# Patient Record
Sex: FEMALE | Race: WHITE | NOT HISPANIC OR LATINO | URBAN - METROPOLITAN AREA
[De-identification: names, ages, dates, MRNs, and addresses within clinical notes are randomized per-mention and may not be internally consistent; named-entity substitution may affect disease eponyms.]

---

## 2023-06-24 ENCOUNTER — INPATIENT (INPATIENT)
Facility: HOSPITAL | Age: 88
LOS: 3 days | Discharge: NURSING, REHAB OR LONG-TERM CARE | DRG: 956 | End: 2023-06-28
Attending: GENERAL ACUTE CARE HOSPITAL | Admitting: GENERAL ACUTE CARE HOSPITAL
Payer: COMMERCIAL

## 2023-06-24 VITALS
DIASTOLIC BLOOD PRESSURE: 65 MMHG | WEIGHT: 110.23 LBS | HEART RATE: 80 BPM | SYSTOLIC BLOOD PRESSURE: 138 MMHG | OXYGEN SATURATION: 98 % | TEMPERATURE: 98 F | HEIGHT: 63 IN | RESPIRATION RATE: 18 BRPM

## 2023-06-24 DIAGNOSIS — S72.141A DISPLACED INTERTROCHANTERIC FRACTURE OF RIGHT FEMUR, INITIAL ENCOUNTER FOR CLOSED FRACTURE: ICD-10-CM

## 2023-06-24 LAB
ALBUMIN SERPL ELPH-MCNC: 4 G/DL — SIGNIFICANT CHANGE UP (ref 3.3–5)
ALP SERPL-CCNC: 68 U/L — SIGNIFICANT CHANGE UP (ref 40–120)
ALT FLD-CCNC: 34 U/L — SIGNIFICANT CHANGE UP (ref 10–45)
ANION GAP SERPL CALC-SCNC: 16 MMOL/L — SIGNIFICANT CHANGE UP (ref 5–17)
APPEARANCE UR: CLEAR — SIGNIFICANT CHANGE UP
APTT BLD: 24.4 SEC — LOW (ref 27.5–35.5)
AST SERPL-CCNC: 67 U/L — HIGH (ref 10–40)
BACTERIA # UR AUTO: NEGATIVE — SIGNIFICANT CHANGE UP
BASE EXCESS BLDV CALC-SCNC: -1.7 MMOL/L — SIGNIFICANT CHANGE UP (ref -2–3)
BASOPHILS # BLD AUTO: 0.02 K/UL — SIGNIFICANT CHANGE UP (ref 0–0.2)
BASOPHILS NFR BLD AUTO: 0.1 % — SIGNIFICANT CHANGE UP (ref 0–2)
BILIRUB SERPL-MCNC: 1.2 MG/DL — SIGNIFICANT CHANGE UP (ref 0.2–1.2)
BILIRUB UR-MCNC: NEGATIVE — SIGNIFICANT CHANGE UP
BUN SERPL-MCNC: 49 MG/DL — HIGH (ref 7–23)
CA-I SERPL-SCNC: 1.13 MMOL/L — LOW (ref 1.15–1.33)
CALCIUM SERPL-MCNC: 9.2 MG/DL — SIGNIFICANT CHANGE UP (ref 8.4–10.5)
CHLORIDE BLDV-SCNC: 103 MMOL/L — SIGNIFICANT CHANGE UP (ref 96–108)
CHLORIDE SERPL-SCNC: 102 MMOL/L — SIGNIFICANT CHANGE UP (ref 96–108)
CK MB BLD-MCNC: 1 % — SIGNIFICANT CHANGE UP (ref 0–3.5)
CK MB CFR SERPL CALC: 20 NG/ML — HIGH (ref 0–3.8)
CK SERPL-CCNC: 1977 U/L — HIGH (ref 25–170)
CO2 BLDV-SCNC: 25 MMOL/L — SIGNIFICANT CHANGE UP (ref 22–26)
CO2 SERPL-SCNC: 20 MMOL/L — LOW (ref 22–31)
COLOR SPEC: YELLOW — SIGNIFICANT CHANGE UP
CREAT SERPL-MCNC: 1.72 MG/DL — HIGH (ref 0.5–1.3)
DIFF PNL FLD: NEGATIVE — SIGNIFICANT CHANGE UP
EGFR: 29 ML/MIN/1.73M2 — LOW
EOSINOPHIL # BLD AUTO: 0 K/UL — SIGNIFICANT CHANGE UP (ref 0–0.5)
EOSINOPHIL NFR BLD AUTO: 0 % — SIGNIFICANT CHANGE UP (ref 0–6)
EPI CELLS # UR: 0 /HPF — SIGNIFICANT CHANGE UP
GAS PNL BLDV: 136 MMOL/L — SIGNIFICANT CHANGE UP (ref 136–145)
GAS PNL BLDV: SIGNIFICANT CHANGE UP
GLUCOSE BLDV-MCNC: 132 MG/DL — HIGH (ref 70–99)
GLUCOSE SERPL-MCNC: 138 MG/DL — HIGH (ref 70–99)
GLUCOSE UR QL: NEGATIVE — SIGNIFICANT CHANGE UP
HCO3 BLDV-SCNC: 24 MMOL/L — SIGNIFICANT CHANGE UP (ref 22–29)
HCT VFR BLD CALC: 30.8 % — LOW (ref 34.5–45)
HCT VFR BLDA CALC: 31 % — LOW (ref 34.5–46.5)
HGB BLD CALC-MCNC: 10.3 G/DL — LOW (ref 11.7–16.1)
HGB BLD-MCNC: 10.2 G/DL — LOW (ref 11.5–15.5)
IMM GRANULOCYTES NFR BLD AUTO: 0.6 % — SIGNIFICANT CHANGE UP (ref 0–0.9)
INR BLD: 0.97 RATIO — SIGNIFICANT CHANGE UP (ref 0.88–1.16)
KETONES UR-MCNC: SIGNIFICANT CHANGE UP
LACTATE BLDV-MCNC: 2.9 MMOL/L — HIGH (ref 0.5–2)
LEUKOCYTE ESTERASE UR-ACNC: NEGATIVE — SIGNIFICANT CHANGE UP
LYMPHOCYTES # BLD AUTO: 0.87 K/UL — LOW (ref 1–3.3)
LYMPHOCYTES # BLD AUTO: 6.2 % — LOW (ref 13–44)
MCHC RBC-ENTMCNC: 31.3 PG — SIGNIFICANT CHANGE UP (ref 27–34)
MCHC RBC-ENTMCNC: 33.1 GM/DL — SIGNIFICANT CHANGE UP (ref 32–36)
MCV RBC AUTO: 94.5 FL — SIGNIFICANT CHANGE UP (ref 80–100)
MONOCYTES # BLD AUTO: 0.88 K/UL — SIGNIFICANT CHANGE UP (ref 0–0.9)
MONOCYTES NFR BLD AUTO: 6.3 % — SIGNIFICANT CHANGE UP (ref 2–14)
NEUTROPHILS # BLD AUTO: 12.13 K/UL — HIGH (ref 1.8–7.4)
NEUTROPHILS NFR BLD AUTO: 86.8 % — HIGH (ref 43–77)
NITRITE UR-MCNC: NEGATIVE — SIGNIFICANT CHANGE UP
NRBC # BLD: 0 /100 WBCS — SIGNIFICANT CHANGE UP (ref 0–0)
PCO2 BLDV: 42 MMHG — SIGNIFICANT CHANGE UP (ref 39–42)
PH BLDV: 7.36 — SIGNIFICANT CHANGE UP (ref 7.32–7.43)
PH UR: 5.5 — SIGNIFICANT CHANGE UP (ref 5–8)
PLATELET # BLD AUTO: 216 K/UL — SIGNIFICANT CHANGE UP (ref 150–400)
PO2 BLDV: 22 MMHG — LOW (ref 25–45)
POTASSIUM BLDV-SCNC: 4.6 MMOL/L — SIGNIFICANT CHANGE UP (ref 3.5–5.1)
POTASSIUM SERPL-MCNC: 4.7 MMOL/L — SIGNIFICANT CHANGE UP (ref 3.5–5.3)
POTASSIUM SERPL-SCNC: 4.7 MMOL/L — SIGNIFICANT CHANGE UP (ref 3.5–5.3)
PROT SERPL-MCNC: 6.8 G/DL — SIGNIFICANT CHANGE UP (ref 6–8.3)
PROT UR-MCNC: ABNORMAL
PROTHROM AB SERPL-ACNC: 11.2 SEC — SIGNIFICANT CHANGE UP (ref 10.5–13.4)
RAPID RVP RESULT: SIGNIFICANT CHANGE UP
RBC # BLD: 3.26 M/UL — LOW (ref 3.8–5.2)
RBC # FLD: 14.6 % — HIGH (ref 10.3–14.5)
RBC CASTS # UR COMP ASSIST: 3 /HPF — SIGNIFICANT CHANGE UP (ref 0–4)
SAO2 % BLDV: 20.5 % — LOW (ref 67–88)
SARS-COV-2 RNA SPEC QL NAA+PROBE: SIGNIFICANT CHANGE UP
SODIUM SERPL-SCNC: 138 MMOL/L — SIGNIFICANT CHANGE UP (ref 135–145)
SP GR SPEC: 1.02 — SIGNIFICANT CHANGE UP (ref 1.01–1.02)
TROPONIN T, HIGH SENSITIVITY RESULT: 37 NG/L — SIGNIFICANT CHANGE UP (ref 0–51)
UROBILINOGEN FLD QL: NEGATIVE — SIGNIFICANT CHANGE UP
WBC # BLD: 13.98 K/UL — HIGH (ref 3.8–10.5)
WBC # FLD AUTO: 13.98 K/UL — HIGH (ref 3.8–10.5)
WBC UR QL: 0 /HPF — SIGNIFICANT CHANGE UP (ref 0–5)

## 2023-06-24 PROCEDURE — 71045 X-RAY EXAM CHEST 1 VIEW: CPT | Mod: 26

## 2023-06-24 PROCEDURE — 73552 X-RAY EXAM OF FEMUR 2/>: CPT | Mod: 26,RT

## 2023-06-24 PROCEDURE — 99285 EMERGENCY DEPT VISIT HI MDM: CPT

## 2023-06-24 PROCEDURE — 72192 CT PELVIS W/O DYE: CPT | Mod: 26

## 2023-06-24 PROCEDURE — 76376 3D RENDER W/INTRP POSTPROCES: CPT | Mod: 26

## 2023-06-24 PROCEDURE — 73560 X-RAY EXAM OF KNEE 1 OR 2: CPT | Mod: 26,RT

## 2023-06-24 PROCEDURE — 76942 ECHO GUIDE FOR BIOPSY: CPT | Mod: 26

## 2023-06-24 PROCEDURE — 73502 X-RAY EXAM HIP UNI 2-3 VIEWS: CPT | Mod: 26,RT

## 2023-06-24 PROCEDURE — 70450 CT HEAD/BRAIN W/O DYE: CPT | Mod: 26,MA

## 2023-06-24 RX ORDER — ACETAMINOPHEN 500 MG
750 TABLET ORAL ONCE
Refills: 0 | Status: COMPLETED | OUTPATIENT
Start: 2023-06-24 | End: 2023-06-24

## 2023-06-24 RX ORDER — ACETAMINOPHEN 500 MG
975 TABLET ORAL EVERY 8 HOURS
Refills: 0 | Status: DISCONTINUED | OUTPATIENT
Start: 2023-06-24 | End: 2023-06-25

## 2023-06-24 RX ORDER — BRIMONIDINE TARTRATE 2 MG/MG
1 SOLUTION/ DROPS OPHTHALMIC
Refills: 0 | Status: DISCONTINUED | OUTPATIENT
Start: 2023-06-24 | End: 2023-06-25

## 2023-06-24 RX ORDER — SENNA PLUS 8.6 MG/1
2 TABLET ORAL AT BEDTIME
Refills: 0 | Status: DISCONTINUED | OUTPATIENT
Start: 2023-06-24 | End: 2023-06-25

## 2023-06-24 RX ORDER — BUPIVACAINE HCL/PF 7.5 MG/ML
30 VIAL (ML) INJECTION ONCE
Refills: 0 | Status: COMPLETED | OUTPATIENT
Start: 2023-06-24 | End: 2023-06-24

## 2023-06-24 RX ORDER — BRIMONIDINE TARTRATE 2 MG/MG
1 SOLUTION/ DROPS OPHTHALMIC
Refills: 0 | DISCHARGE

## 2023-06-24 RX ORDER — OXYCODONE HYDROCHLORIDE 5 MG/1
2.5 TABLET ORAL EVERY 4 HOURS
Refills: 0 | Status: DISCONTINUED | OUTPATIENT
Start: 2023-06-24 | End: 2023-06-25

## 2023-06-24 RX ORDER — DORZOLAMIDE HYDROCHLORIDE TIMOLOL MALEATE 20; 5 MG/ML; MG/ML
1 SOLUTION/ DROPS OPHTHALMIC
Refills: 0 | Status: DISCONTINUED | OUTPATIENT
Start: 2023-06-24 | End: 2023-06-25

## 2023-06-24 RX ORDER — LATANOPROST 0.05 MG/ML
1 SOLUTION/ DROPS OPHTHALMIC; TOPICAL
Refills: 0 | DISCHARGE

## 2023-06-24 RX ORDER — FAMOTIDINE 10 MG/ML
20 INJECTION INTRAVENOUS ONCE
Refills: 0 | Status: DISCONTINUED | OUTPATIENT
Start: 2023-06-24 | End: 2023-06-25

## 2023-06-24 RX ORDER — DORZOLAMIDE HYDROCHLORIDE TIMOLOL MALEATE 20; 5 MG/ML; MG/ML
1 SOLUTION/ DROPS OPHTHALMIC
Refills: 0 | DISCHARGE

## 2023-06-24 RX ORDER — SODIUM CHLORIDE 9 MG/ML
1000 INJECTION, SOLUTION INTRAVENOUS
Refills: 0 | Status: DISCONTINUED | OUTPATIENT
Start: 2023-06-24 | End: 2023-06-25

## 2023-06-24 RX ORDER — OXYCODONE HYDROCHLORIDE 5 MG/1
5 TABLET ORAL EVERY 4 HOURS
Refills: 0 | Status: DISCONTINUED | OUTPATIENT
Start: 2023-06-24 | End: 2023-06-25

## 2023-06-24 RX ORDER — LATANOPROST 0.05 MG/ML
1 SOLUTION/ DROPS OPHTHALMIC; TOPICAL AT BEDTIME
Refills: 0 | Status: DISCONTINUED | OUTPATIENT
Start: 2023-06-24 | End: 2023-06-25

## 2023-06-24 RX ADMIN — Medication 300 MILLIGRAM(S): at 20:34

## 2023-06-24 NOTE — H&P ADULT - ATTENDING COMMENTS
Associated images, notes, and labs were reviewed. The patient was seen and examined. Risks and benefits of operative versus nonoperative management were discussed with the patient. The patient showed a good understanding of the injury and the treatment options. They would like to move forward with operative management of the right hip fracture.

## 2023-06-24 NOTE — ED ADULT NURSE NOTE - NSFALLHARMRISKINTERV_ED_ALL_ED
Assistance OOB with selected safe patient handling equipment if applicable/Assistance with ambulation/Communicate risk of Fall with Harm to all staff, patient, and family/Monitor gait and stability/Provide visual cue: red socks, yellow wristband, yellow gown, etc/Reinforce activity limits and safety measures with patient and family/Bed in lowest position, wheels locked, appropriate side rails in place/Call bell, personal items and telephone in reach/Instruct patient to call for assistance before getting out of bed/chair/stretcher/Non-slip footwear applied when patient is off stretcher/[place] to call system/Physically safe environment - no spills, clutter or unnecessary equipment/Purposeful Proactive Rounding/Room/bathroom lighting operational, light cord in reach

## 2023-06-24 NOTE — ED PROVIDER NOTE - PHYSICAL EXAMINATION
GENERAL: no acute distress, non-toxic appearing  HEAD: normocephalic, atraumatic  HEENT: normal conjunctiva, oral mucosa moist, neck supple  CARDIAC: regular rate and rhythm, normal S1 and S2,  no appreciable murmurs  PULM: clear to ascultation bilaterally, no crackles, rales, rhonchi, or wheezing  GI: abdomen nondistended, soft, nontender, no guarding or rebound tenderness  NEURO: alert and oriented x 3, normal speech, moving all extremities   MSK: +R hip internally rotated, shortened - difficulty ranging hip but neurovascularly intact distally, no peripheral edema, calf tenderness/redness/swelling  SKIN: no visible rashes, dry, well-perfused  PSYCH: appropriate mood and affect

## 2023-06-24 NOTE — ED PROVIDER NOTE - OBJECTIVE STATEMENT
86 yo F pmh of glaucoma presents with R hip pain after being found down on stairs at her home by tenants today. AS per patient she fell 2 weeks ago and has had R hip pain and she has been crawling around which is what she was doing today. Says she did not fall today, did not hit head. No A/C. As per EMS patient was found down on stairs and has been down for 2 days.

## 2023-06-24 NOTE — ED ADULT NURSE NOTE - OBJECTIVE STATEMENT
86 y/o F A&Ox3 (poor historian) presents to the ED complaining of right hip pain. Pt states that she fell and was "crawling around on the ground for several days." Pt cannot recall how long ago she fell. Pt was BIBEMS after tenant of pt called ambulance. Pt denies any PMH or taking blood thinners. Pt typically ambulates independently and denies LOC. On assessment, pt right leg is shortened and externally rotated. Bruising noted to the right hip. Pt is unable to move right leg. Pt denies N/V/D, fever, chills, urinary symptoms, headache, SOB, chest pain.

## 2023-06-24 NOTE — ED ADULT NURSE REASSESSMENT NOTE - NS ED NURSE REASSESS COMMENT FT1
Patient straight cathed for urine using sterile technique. Second RN present to confirm sterility. Explained procedure as it was being done - Pt tolerated procedure well. Sterile specimens collected and sent to lab as ordered. drained aprox 500ml of urine. Comfort and safety provided.

## 2023-06-24 NOTE — CONSULT NOTE ADULT - SUBJECTIVE AND OBJECTIVE BOX
JULIANO SWIFT  87y Female  MRN:[id number]    Patient is a 87y old  Female who presents with a chief complaint of right hip fx    HPI:  Patient is a 87yFemale home ambulator with assistive devices, with no known pmhx, who presents to ED w/ a c/o of right hip pain. Patient states she fell 2 weeks ago. Denies HH/LOC. States inability to walk immediately following the injury. Denies any numbness or tingling. Denies having any other pain elsewhere. Denies any previous orthopedic history. No other orthopedic concerns at this time.  denies chest pain. no sob. no n/v/d. no fevers    (24 Jun 2023 22:41)      Patient seen and evaluated at bedside in ER    PAST MEDICAL & SURGICAL HISTORY:  Glaucoma          REVIEW OF SYSTEMS:  Constitutional: No fever, chills, fatigue or weight loss.  Skin: No rash.  Eyes: No recent vision problems or eye pain.  ENT: No congestion, ear pain, or sore throat.  Endocrine: No thyroid problems.  Cardiovascular: No chest pain or palpation.  Respiratory: No cough, shortness of breath, congestion, or wheezing.  Gastrointestinal: No abdominal pain, nausea, vomiting, or diarrhea.  Genitourinary: No dysuria.  Musculoskeletal: a per hpi  Neurologic: No headache.    VITALS:  Vital Signs Last 24 Hrs  T(C): 36.9 (24 Jun 2023 17:56), Max: 36.9 (24 Jun 2023 17:56)  T(F): 98.4 (24 Jun 2023 17:56), Max: 98.4 (24 Jun 2023 17:56)  HR: 88 (24 Jun 2023 20:36) (80 - 100)  BP: 131/65 (24 Jun 2023 20:36) (120/78 - 138/65)  BP(mean): --  RR: 17 (24 Jun 2023 20:36) (17 - 18)  SpO2: 100% (24 Jun 2023 20:36) (98% - 100%)    Parameters below as of 24 Jun 2023 20:36  Patient On (Oxygen Delivery Method): room air      CAPILLARY BLOOD GLUCOSE        I&O's Summary      PHYSICAL EXAM:  GENERAL: NAD, well-developed  HEAD:  Atraumatic, Normocephalic  EYES: EOMI, PERRLA, conjunctiva and sclera clear  NECK: Supple, No JVD  CHEST/LUNG: Clear to auscultation bilaterally; No wheeze  HEART: S1, S2; No murmurs, rubs, or gallops  ABDOMEN: Soft, Nontender, Nondistended; Bowel sounds present  EXTREMITIES:  2+ Peripheral Pulses, No clubbing, cyanosis, or edema  PSYCH: Normal affect  NEUROLOGY: AAOX3; non-focal  SKIN: No rashes or lesions    Consultant(s) Notes Reviewed:  [x ] YES  [ ] NO  Care Discussed with Consultants/Other Providers [ x] YES  [ ] NO    MEDS:  MEDICATIONS  (STANDING):  brimonidine 0.2% Ophthalmic Solution 1 Drop(s) Right EYE two times a day  dorzolamide 2%/timolol 0.5% Ophthalmic Solution 1 Drop(s) Both EYES two times a day  famotidine Injectable 20 milliGRAM(s) IV Push once  lactated ringers. 1000 milliLiter(s) (125 mL/Hr) IV Continuous <Continuous>  latanoprost 0.005% Ophthalmic Solution 1 Drop(s) Both EYES at bedtime  senna 2 Tablet(s) Oral at bedtime    MEDICATIONS  (PRN):  acetaminophen     Tablet .. 975 milliGRAM(s) Oral every 8 hours PRN Mild Pain (1 - 3)  oxyCODONE    IR 5 milliGRAM(s) Oral every 4 hours PRN Severe Pain (7 - 10)  oxyCODONE    IR 2.5 milliGRAM(s) Oral every 4 hours PRN Moderate Pain (4 - 6)    ALLERGIES:  No Known Allergies      LABS:                        10.2   13.98 )-----------( 216      ( 24 Jun 2023 18:26 )             30.8     06-24    138  |  102  |  49<H>  ----------------------------<  138<H>  4.7   |  20<L>  |  1.72<H>    Ca    9.2      24 Jun 2023 18:26    TPro  6.8  /  Alb  4.0  /  TBili  1.2  /  DBili  x   /  AST  67<H>  /  ALT  34  /  AlkPhos  68  06-24    PT/INR - ( 24 Jun 2023 18:26 )   PT: 11.2 sec;   INR: 0.97 ratio         PTT - ( 24 Jun 2023 18:26 )  PTT:24.4 sec  CARDIAC MARKERS ( 24 Jun 2023 18:26 )  x     / x     / 1977 U/L / x     / 20.0 ng/mL      LIVER FUNCTIONS - ( 24 Jun 2023 18:26 )  Alb: 4.0 g/dL / Pro: 6.8 g/dL / ALK PHOS: 68 U/L / ALT: 34 U/L / AST: 67 U/L / GGT: x           Urinalysis Basic - ( 24 Jun 2023 18:26 )    Color: x / Appearance: x / SG: x / pH: x  Gluc: 138 mg/dL / Ketone: x  / Bili: x / Urobili: x   Blood: x / Protein: x / Nitrite: x   Leuk Esterase: x / RBC: x / WBC x   Sq Epi: x / Non Sq Epi: x / Bacteria: x       < from: Xray Chest 1 View- PORTABLE-Urgent (Xray Chest 1 View- PORTABLE-Urgent .) (06.24.23 @ 18:54) >    IMPRESSION:  No acute traumatic findings.        < end of copied text >  < from: Xray Femur 2 Views, Right (06.24.23 @ 18:54) >  IMPRESSION:  Acute comminuted right intertrochanteric fracture. Impaction of the   primary components with displacement of the lesser trochanteric component.  Mild bilateral hip joint space narrowing. Degenerative changes of the   lumbar spine.    < end of copied text >     EKG: NSR. inferior changes

## 2023-06-24 NOTE — ED PROVIDER NOTE - ATTENDING CONTRIBUTION TO CARE
I, Radha Tate, performed a history and physical exam of the patient and discussed their management with the resident, student, and/or fellow. I reviewed the note and agree with the documented findings and plan of care. I was present and available for all procedures.

## 2023-06-24 NOTE — H&P ADULT - HISTORY OF PRESENT ILLNESS
Patient is a 87yFemale home ambulator with assistive devices who presents to ED w/ a c/o of right hip pain. Patient states she fell last night. Denies HH/LOC. States inability to walk immediately following the injury. Denies any numbness or tingling. Denies having any other pain elsewhere. Denies any previous orthopedic history. No other orthopedic concerns at this time.

## 2023-06-24 NOTE — ED PROVIDER NOTE - CLINICAL SUMMARY MEDICAL DECISION MAKING FREE TEXT BOX
88 yo hx of glaucoma presents with fall with R hip injury - internally rotated shortened hip. Will get XR of hip, CT head, labs. Likely admit as patient cannot ambulate and lives alone. 88 yo hx of glaucoma presents with fall with R hip injury - internally rotated shortened hip. Will get XR of hip, CT head, labs. Likely admit as patient cannot ambulate and lives alone.    Radha Tate MD (Attending Physician): 87F who is hard of hearing presents for fall. Per EMS, patient fell 2 weeks ago and then fell yesterday, found by neighbors/other tenants and was on the fall. Per patient, she states she had a mechanical fall 2 wks ago but denies falling recently. Not on AC.     On exam:  Const: Cooperative, in NAD, hard of hearing   HEENT: Head is normocephalic, atraumatic. PERRLA. EOMI. Sclera and conjunctiva normal  Lungs:clear to auscultation bilaterally. No wheezes, crackles, rhonchi   Cardiovascular: RRR, no murmurs, rubs or gallops.  Abdomen: No scars. No distension. Soft and nontender. No rebound, guarding or masses noted.  Back: No midlines or paraspinous tenderness.   Skin: different stages of ecchymosis on legs   MSK: internally rotated and shortened right lower extremity. +2 dp pulses bilaterally. Tender in R greater trochanter   Neuro:Awake, AOx3, follows commands    Differentials include but are not limited to: fracture, rhabdomyolysis, infection, dehydration, dislocation, soft tissue swelling/contusion, arrhythmia. Will plan for labs, imaging.    XR concerning for fracture. Consent obtained for nerve block and will consult ortho.

## 2023-06-24 NOTE — CONSULT NOTE ADULT - ASSESSMENT
87 female no known pmhx here s/p fall with right hip fx    right hip fx  ortho following  pending OR  pain control  NWB  given elevated CK and ekg changes would check echo prior to OR    elevated CK  suspect 2/2 rhabdo  would trend  ivf    edmundo  unknown baseline  likely pre-renal  iv hydration    dvt ppx      Advanced care planning was discussed with patient and family.  Advanced care planning forms were reviewed and discussed as appropriate.  Differential diagnosis and plan of care discussed with patient after the evaluation.   Pain assessed and judicious use of narcotics when appropriate was discussed.  Importance of Fall prevention discussed.  Counseling on Smoking and Alcohol cessation was offered when appropriate.  Counseling on Diet, exercise, and medication compliance was done.       Approx 60 minutes spent.

## 2023-06-24 NOTE — H&P ADULT - NSHPPHYSICALEXAM_GEN_ALL_CORE
PHYSICAL EXAM:  T(C): 36.9 (06-24-23 @ 17:56), Max: 36.9 (06-24-23 @ 17:56)  HR: 88 (06-24-23 @ 20:36) (80 - 100)  BP: 131/65 (06-24-23 @ 20:36) (120/78 - 138/65)  RR: 17 (06-24-23 @ 20:36) (17 - 18)  SpO2: 100% (06-24-23 @ 20:36) (98% - 100%)    Gen: NAD, Resting comfortably    RLE:  Skin intact, no erythema or ecchymosis  pos bony tenderness to palpation right hip  +EHL/FHL/TA/GSC  +SILT L3-S1  + DP  Compartments soft and compressible  No calf tenderness    Secondary Exam: small superficial abrasion right elbow o/w Benign, Skin intact, NTTP along axial spine, SILT throughout, motor grossly intact throughout, no other orthopedic injuries at this time, compartments soft and compressible

## 2023-06-24 NOTE — ED PROVIDER NOTE - PROGRESS NOTE DETAILS
Radha Tate MD (Attending Physician): Fascia iliaca block done without complications. Pt tolerated procedure well.     Patient is pain free. VSS

## 2023-06-24 NOTE — H&P ADULT - ASSESSMENT
A/P: 87F w R IT fx    plan for OR tomorrow w Dr Will  npo after mn  preop labs  admit to ortho  medical optimization   Analgesia  NWB  SCD  Ice and elevate as tolerated  Discussed with attending who is in agreement with above plan  discussed with pt and family all agree

## 2023-06-25 DIAGNOSIS — S72.001A FRACTURE OF UNSPECIFIED PART OF NECK OF RIGHT FEMUR, INITIAL ENCOUNTER FOR CLOSED FRACTURE: ICD-10-CM

## 2023-06-25 LAB
ANION GAP SERPL CALC-SCNC: 10 MMOL/L — SIGNIFICANT CHANGE UP (ref 5–17)
ANION GAP SERPL CALC-SCNC: 11 MMOL/L — SIGNIFICANT CHANGE UP (ref 5–17)
APTT BLD: 23.8 SEC — LOW (ref 27.5–35.5)
BUN SERPL-MCNC: 48 MG/DL — HIGH (ref 7–23)
BUN SERPL-MCNC: 56 MG/DL — HIGH (ref 7–23)
CALCIUM SERPL-MCNC: 7.5 MG/DL — LOW (ref 8.4–10.5)
CALCIUM SERPL-MCNC: 8.2 MG/DL — LOW (ref 8.4–10.5)
CHLORIDE SERPL-SCNC: 105 MMOL/L — SIGNIFICANT CHANGE UP (ref 96–108)
CHLORIDE SERPL-SCNC: 107 MMOL/L — SIGNIFICANT CHANGE UP (ref 96–108)
CK SERPL-CCNC: 1266 U/L — HIGH (ref 25–170)
CO2 SERPL-SCNC: 22 MMOL/L — SIGNIFICANT CHANGE UP (ref 22–31)
CO2 SERPL-SCNC: 23 MMOL/L — SIGNIFICANT CHANGE UP (ref 22–31)
CREAT SERPL-MCNC: 1.1 MG/DL — SIGNIFICANT CHANGE UP (ref 0.5–1.3)
CREAT SERPL-MCNC: 1.56 MG/DL — HIGH (ref 0.5–1.3)
EGFR: 32 ML/MIN/1.73M2 — LOW
EGFR: 49 ML/MIN/1.73M2 — LOW
GLUCOSE SERPL-MCNC: 123 MG/DL — HIGH (ref 70–99)
GLUCOSE SERPL-MCNC: 125 MG/DL — HIGH (ref 70–99)
HCT VFR BLD CALC: 24.7 % — LOW (ref 34.5–45)
HCT VFR BLD CALC: 26.4 % — LOW (ref 34.5–45)
HGB BLD-MCNC: 7.8 G/DL — LOW (ref 11.5–15.5)
HGB BLD-MCNC: 8.9 G/DL — LOW (ref 11.5–15.5)
INR BLD: 0.99 RATIO — SIGNIFICANT CHANGE UP (ref 0.88–1.16)
MCHC RBC-ENTMCNC: 30.5 PG — SIGNIFICANT CHANGE UP (ref 27–34)
MCHC RBC-ENTMCNC: 31.6 GM/DL — LOW (ref 32–36)
MCHC RBC-ENTMCNC: 31.8 PG — SIGNIFICANT CHANGE UP (ref 27–34)
MCHC RBC-ENTMCNC: 33.7 GM/DL — SIGNIFICANT CHANGE UP (ref 32–36)
MCV RBC AUTO: 94.3 FL — SIGNIFICANT CHANGE UP (ref 80–100)
MCV RBC AUTO: 96.5 FL — SIGNIFICANT CHANGE UP (ref 80–100)
NRBC # BLD: 0 /100 WBCS — SIGNIFICANT CHANGE UP (ref 0–0)
NRBC # BLD: 0 /100 WBCS — SIGNIFICANT CHANGE UP (ref 0–0)
PLATELET # BLD AUTO: 140 K/UL — LOW (ref 150–400)
PLATELET # BLD AUTO: 156 K/UL — SIGNIFICANT CHANGE UP (ref 150–400)
POTASSIUM SERPL-MCNC: 3.9 MMOL/L — SIGNIFICANT CHANGE UP (ref 3.5–5.3)
POTASSIUM SERPL-MCNC: 4.3 MMOL/L — SIGNIFICANT CHANGE UP (ref 3.5–5.3)
POTASSIUM SERPL-SCNC: 3.9 MMOL/L — SIGNIFICANT CHANGE UP (ref 3.5–5.3)
POTASSIUM SERPL-SCNC: 4.3 MMOL/L — SIGNIFICANT CHANGE UP (ref 3.5–5.3)
PROTHROM AB SERPL-ACNC: 11.4 SEC — SIGNIFICANT CHANGE UP (ref 10.5–13.4)
RBC # BLD: 2.56 M/UL — LOW (ref 3.8–5.2)
RBC # BLD: 2.8 M/UL — LOW (ref 3.8–5.2)
RBC # FLD: 14.9 % — HIGH (ref 10.3–14.5)
RBC # FLD: 15 % — HIGH (ref 10.3–14.5)
SODIUM SERPL-SCNC: 139 MMOL/L — SIGNIFICANT CHANGE UP (ref 135–145)
SODIUM SERPL-SCNC: 139 MMOL/L — SIGNIFICANT CHANGE UP (ref 135–145)
WBC # BLD: 10.35 K/UL — SIGNIFICANT CHANGE UP (ref 3.8–10.5)
WBC # BLD: 9.31 K/UL — SIGNIFICANT CHANGE UP (ref 3.8–10.5)
WBC # FLD AUTO: 10.35 K/UL — SIGNIFICANT CHANGE UP (ref 3.8–10.5)
WBC # FLD AUTO: 9.31 K/UL — SIGNIFICANT CHANGE UP (ref 3.8–10.5)

## 2023-06-25 PROCEDURE — 76376 3D RENDER W/INTRP POSTPROCES: CPT | Mod: 26

## 2023-06-25 PROCEDURE — 93356 MYOCRD STRAIN IMG SPCKL TRCK: CPT

## 2023-06-25 PROCEDURE — 93306 TTE W/DOPPLER COMPLETE: CPT | Mod: 26

## 2023-06-25 PROCEDURE — 27245 TREAT THIGH FRACTURE: CPT | Mod: RT

## 2023-06-25 DEVICE — GUIDEWIRE BALL TIP 3MM X 1000MM FOR T2 R1.5 FEMORAL NAILING SYSTEM: Type: IMPLANTABLE DEVICE | Site: RIGHT | Status: FUNCTIONAL

## 2023-06-25 DEVICE — SCREW LOKG 5X37.5MM: Type: IMPLANTABLE DEVICE | Site: RIGHT | Status: FUNCTIONAL

## 2023-06-25 DEVICE — K-WIRE STRYKER 3.2M X 450MM: Type: IMPLANTABLE DEVICE | Site: RIGHT | Status: FUNCTIONAL

## 2023-06-25 DEVICE — SCREW LAG GAMMA 10.5X105MM: Type: IMPLANTABLE DEVICE | Site: RIGHT | Status: FUNCTIONAL

## 2023-06-25 DEVICE — STRYKER TROCHANTERIC NAIL 10MM X 170MM 125 DEGREE: Type: IMPLANTABLE DEVICE | Site: RIGHT | Status: FUNCTIONAL

## 2023-06-25 RX ORDER — SODIUM CHLORIDE 9 MG/ML
1000 INJECTION INTRAMUSCULAR; INTRAVENOUS; SUBCUTANEOUS
Refills: 0 | Status: DISCONTINUED | OUTPATIENT
Start: 2023-06-25 | End: 2023-06-25

## 2023-06-25 RX ORDER — SODIUM CHLORIDE 9 MG/ML
1000 INJECTION INTRAMUSCULAR; INTRAVENOUS; SUBCUTANEOUS
Refills: 0 | Status: DISCONTINUED | OUTPATIENT
Start: 2023-06-25 | End: 2023-06-28

## 2023-06-25 RX ORDER — ENOXAPARIN SODIUM 100 MG/ML
40 INJECTION SUBCUTANEOUS EVERY 24 HOURS
Refills: 0 | Status: DISCONTINUED | OUTPATIENT
Start: 2023-06-26 | End: 2023-06-28

## 2023-06-25 RX ORDER — POLYETHYLENE GLYCOL 3350 17 G/17G
17 POWDER, FOR SOLUTION ORAL DAILY
Refills: 0 | Status: DISCONTINUED | OUTPATIENT
Start: 2023-06-25 | End: 2023-06-28

## 2023-06-25 RX ORDER — ONDANSETRON 8 MG/1
4 TABLET, FILM COATED ORAL ONCE
Refills: 0 | Status: DISCONTINUED | OUTPATIENT
Start: 2023-06-25 | End: 2023-06-25

## 2023-06-25 RX ORDER — CEFAZOLIN SODIUM 1 G
2000 VIAL (EA) INJECTION EVERY 8 HOURS
Refills: 0 | Status: COMPLETED | OUTPATIENT
Start: 2023-06-25 | End: 2023-06-26

## 2023-06-25 RX ORDER — LANOLIN ALCOHOL/MO/W.PET/CERES
3 CREAM (GRAM) TOPICAL AT BEDTIME
Refills: 0 | Status: DISCONTINUED | OUTPATIENT
Start: 2023-06-25 | End: 2023-06-28

## 2023-06-25 RX ORDER — SENNA PLUS 8.6 MG/1
2 TABLET ORAL AT BEDTIME
Refills: 0 | Status: DISCONTINUED | OUTPATIENT
Start: 2023-06-25 | End: 2023-06-28

## 2023-06-25 RX ORDER — PANTOPRAZOLE SODIUM 20 MG/1
40 TABLET, DELAYED RELEASE ORAL
Refills: 0 | Status: DISCONTINUED | OUTPATIENT
Start: 2023-06-25 | End: 2023-06-28

## 2023-06-25 RX ORDER — DORZOLAMIDE HYDROCHLORIDE TIMOLOL MALEATE 20; 5 MG/ML; MG/ML
1 SOLUTION/ DROPS OPHTHALMIC
Refills: 0 | Status: DISCONTINUED | OUTPATIENT
Start: 2023-06-25 | End: 2023-06-28

## 2023-06-25 RX ORDER — ACETAMINOPHEN 500 MG
975 TABLET ORAL EVERY 8 HOURS
Refills: 0 | Status: DISCONTINUED | OUTPATIENT
Start: 2023-06-25 | End: 2023-06-28

## 2023-06-25 RX ORDER — OXYCODONE HYDROCHLORIDE 5 MG/1
5 TABLET ORAL EVERY 4 HOURS
Refills: 0 | Status: DISCONTINUED | OUTPATIENT
Start: 2023-06-25 | End: 2023-06-26

## 2023-06-25 RX ORDER — CHOLECALCIFEROL (VITAMIN D3) 125 MCG
2000 CAPSULE ORAL DAILY
Refills: 0 | Status: DISCONTINUED | OUTPATIENT
Start: 2023-06-25 | End: 2023-06-28

## 2023-06-25 RX ORDER — MAGNESIUM HYDROXIDE 400 MG/1
30 TABLET, CHEWABLE ORAL DAILY
Refills: 0 | Status: DISCONTINUED | OUTPATIENT
Start: 2023-06-25 | End: 2023-06-28

## 2023-06-25 RX ORDER — ONDANSETRON 8 MG/1
4 TABLET, FILM COATED ORAL EVERY 6 HOURS
Refills: 0 | Status: DISCONTINUED | OUTPATIENT
Start: 2023-06-25 | End: 2023-06-28

## 2023-06-25 RX ORDER — OXYCODONE HYDROCHLORIDE 5 MG/1
2.5 TABLET ORAL EVERY 4 HOURS
Refills: 0 | Status: DISCONTINUED | OUTPATIENT
Start: 2023-06-25 | End: 2023-06-26

## 2023-06-25 RX ORDER — HYDROMORPHONE HYDROCHLORIDE 2 MG/ML
0.25 INJECTION INTRAMUSCULAR; INTRAVENOUS; SUBCUTANEOUS
Refills: 0 | Status: DISCONTINUED | OUTPATIENT
Start: 2023-06-25 | End: 2023-06-25

## 2023-06-25 RX ORDER — FENTANYL CITRATE 50 UG/ML
25 INJECTION INTRAVENOUS
Refills: 0 | Status: DISCONTINUED | OUTPATIENT
Start: 2023-06-25 | End: 2023-06-25

## 2023-06-25 RX ADMIN — Medication 975 MILLIGRAM(S): at 06:11

## 2023-06-25 RX ADMIN — Medication 100 MILLIGRAM(S): at 21:38

## 2023-06-25 RX ADMIN — Medication 975 MILLIGRAM(S): at 21:38

## 2023-06-25 RX ADMIN — SENNA PLUS 2 TABLET(S): 8.6 TABLET ORAL at 21:39

## 2023-06-25 RX ADMIN — DORZOLAMIDE HYDROCHLORIDE TIMOLOL MALEATE 1 DROP(S): 20; 5 SOLUTION/ DROPS OPHTHALMIC at 21:42

## 2023-06-25 RX ADMIN — SODIUM CHLORIDE 75 MILLILITER(S): 9 INJECTION INTRAMUSCULAR; INTRAVENOUS; SUBCUTANEOUS at 14:37

## 2023-06-25 RX ADMIN — Medication 975 MILLIGRAM(S): at 22:08

## 2023-06-25 RX ADMIN — BRIMONIDINE TARTRATE 1 DROP(S): 2 SOLUTION/ DROPS OPHTHALMIC at 06:03

## 2023-06-25 RX ADMIN — Medication 975 MILLIGRAM(S): at 07:00

## 2023-06-25 RX ADMIN — DORZOLAMIDE HYDROCHLORIDE TIMOLOL MALEATE 1 DROP(S): 20; 5 SOLUTION/ DROPS OPHTHALMIC at 06:03

## 2023-06-25 RX ADMIN — SODIUM CHLORIDE 125 MILLILITER(S): 9 INJECTION INTRAMUSCULAR; INTRAVENOUS; SUBCUTANEOUS at 01:11

## 2023-06-25 RX ADMIN — Medication 3 MILLIGRAM(S): at 22:53

## 2023-06-25 NOTE — PRE-ANESTHESIA EVALUATION ADULT - NSANTHPMHFT_GEN_ALL_CORE
Patient is a 87yFemale home ambulator with assistive devices, with no known pmhx, who presents to ED w/ a c/o of right hip pain

## 2023-06-25 NOTE — PACU DISCHARGE NOTE - COMMENTS
No anesthesia complications
generalized rash (possible from bottle feeding), dx by Pediatrician as eczema and prescribed ointment (Aveeno eczema cream), not improving baby has no distress during assessment

## 2023-06-25 NOTE — BRIEF OPERATIVE NOTE - NSICDXBRIEFPROCEDURE_GEN_ALL_CORE_FT
PROCEDURES:  Open reduction and internal fixation (ORIF) of right hip with intramedullary nail 25-Jun-2023 12:49:57  Shola Fox

## 2023-06-25 NOTE — CHART NOTE - NSCHARTNOTEFT_GEN_A_CORE
Resting without complaints.  No Chest Pain, SOB, N/V.    T(C): 36.7 (06-25-23 @ 15:15), Max: 36.9 (06-24-23 @ 17:56)  HR: 65 (06-25-23 @ 15:15) (65 - 116)  BP: 114/66 (06-25-23 @ 15:15) (94/59 - 164/67)  RR: 18 (06-25-23 @ 15:15) (12 - 18)  SpO2: 96% (06-25-23 @ 15:15) (95% - 100%)  Wt(kg): --    Exam:  Alert and [place], No Acute Distress  R hip dsgs c/d/i with soft/compressible compartments  Calves soft, non-tender bilaterally  +PF/DF/EHL/FHL  SILT  + DP pulse                       7.8    9.31  )-----------( 140      ( 25 Jun 2023 13:39 )             24.7    06-25    139  |  107  |  48<H>  ----------------------------<  123<H>  3.9   |  22  |  1.10    Ca    7.5<L>      25 Jun 2023 13:39    TPro  6.8  /  Alb  4.0  /  TBili  1.2  /  DBili  x   /  AST  67<H>  /  ALT  34  /  AlkPhos  68  06-24      A/P: 87yFemale S/p R hip IM nail.  Acute blood loss anemia 2/2 fracture.  . VSS. NAD  -PT/OT-WBAT  -1U prbc  -IS  -DVT PPx  -Pain Control  -Continue Current Tx    Robinson Marmolejo PA-C  Team Pager #[phone]
POST-OPERATIVE SIGN OUT NOTE  87F s/p right hip intramedullary nail for right IT fx    -Dressing: 4x4 gauze and tegaderm  -Skin closed with monocryl, dermabond, and steri-strips  -Pain control as needed  -Complete perioperative abx  -VTE ppx: Lovenox starting POD #1  -WBAT   -OOB with assistance as needed  -PT/OT  -Ice and elevate  -Encourage incentive spirometry  -Medical management per primary team    -DC planning pending PT eval  -Discussed with attending who agrees with plan

## 2023-06-25 NOTE — PATIENT PROFILE ADULT - FALL HARM RISK - HARM RISK INTERVENTIONS
Assistance with ambulation/Assistance OOB with selected safe patient handling equipment/Communicate Risk of Fall with Harm to all staff/Discuss with provider need for PT consult/Monitor gait and stability/Reinforce activity limits and safety measures with patient and family/Tailored Fall Risk Interventions/Visual Cue: Yellow wristband and red socks/Bed in lowest position, wheels locked, appropriate side rails in place/Call bell, personal items and telephone in reach/Instruct patient to call for assistance before getting out of bed or chair/Non-slip footwear when patient is out of bed/[place] to call system/Physically safe environment - no spills, clutter or unnecessary equipment/Purposeful Proactive Rounding/Room/bathroom lighting operational, light cord in reach

## 2023-06-25 NOTE — BRIEF OPERATIVE NOTE - CONDITION POST OP
I have reviewed discharge instructions with the patient. The patient verbalized understanding. Ambulatory from ED. No s/s of reaction to medication. Stable

## 2023-06-25 NOTE — PROVIDER CONTACT NOTE (OTHER) - ACTION/TREATMENT ORDERED:
Iván Vázquez MD made aware. As per MD no interventions at this time. Encourage urination and allow additional time. Continue to monitor.

## 2023-06-25 NOTE — PROGRESS NOTE ADULT - PROBLEM SELECTOR PLAN 1
-Bedrest  -IS  -DVT PPx-hold for OR  -NPO/IVF  -Pain Control  -Continue Current Tx  -OR today    Robinson Marmolejo PA-C  Team Pager #[phone]

## 2023-06-26 LAB
ANION GAP SERPL CALC-SCNC: 10 MMOL/L — SIGNIFICANT CHANGE UP (ref 5–17)
BUN SERPL-MCNC: 28 MG/DL — HIGH (ref 7–23)
CALCIUM SERPL-MCNC: 7.9 MG/DL — LOW (ref 8.4–10.5)
CHLORIDE SERPL-SCNC: 107 MMOL/L — SIGNIFICANT CHANGE UP (ref 96–108)
CK SERPL-CCNC: 785 U/L — HIGH (ref 25–170)
CO2 SERPL-SCNC: 23 MMOL/L — SIGNIFICANT CHANGE UP (ref 22–31)
CREAT SERPL-MCNC: 0.82 MG/DL — SIGNIFICANT CHANGE UP (ref 0.5–1.3)
CULTURE RESULTS: SIGNIFICANT CHANGE UP
EGFR: 70 ML/MIN/1.73M2 — SIGNIFICANT CHANGE UP
GLUCOSE SERPL-MCNC: 101 MG/DL — HIGH (ref 70–99)
HCT VFR BLD CALC: 29.1 % — LOW (ref 34.5–45)
HGB BLD-MCNC: 9.6 G/DL — LOW (ref 11.5–15.5)
MCHC RBC-ENTMCNC: 31.3 PG — SIGNIFICANT CHANGE UP (ref 27–34)
MCHC RBC-ENTMCNC: 33 GM/DL — SIGNIFICANT CHANGE UP (ref 32–36)
MCV RBC AUTO: 94.8 FL — SIGNIFICANT CHANGE UP (ref 80–100)
NRBC # BLD: 0 /100 WBCS — SIGNIFICANT CHANGE UP (ref 0–0)
PLATELET # BLD AUTO: 107 K/UL — LOW (ref 150–400)
POTASSIUM SERPL-MCNC: 4 MMOL/L — SIGNIFICANT CHANGE UP (ref 3.5–5.3)
POTASSIUM SERPL-SCNC: 4 MMOL/L — SIGNIFICANT CHANGE UP (ref 3.5–5.3)
RBC # BLD: 3.07 M/UL — LOW (ref 3.8–5.2)
RBC # FLD: 15.2 % — HIGH (ref 10.3–14.5)
SODIUM SERPL-SCNC: 140 MMOL/L — SIGNIFICANT CHANGE UP (ref 135–145)
SPECIMEN SOURCE: SIGNIFICANT CHANGE UP
WBC # BLD: 8.49 K/UL — SIGNIFICANT CHANGE UP (ref 3.8–10.5)
WBC # FLD AUTO: 8.49 K/UL — SIGNIFICANT CHANGE UP (ref 3.8–10.5)

## 2023-06-26 RX ADMIN — OXYCODONE HYDROCHLORIDE 2.5 MILLIGRAM(S): 5 TABLET ORAL at 03:11

## 2023-06-26 RX ADMIN — POLYETHYLENE GLYCOL 3350 17 GRAM(S): 17 POWDER, FOR SOLUTION ORAL at 11:49

## 2023-06-26 RX ADMIN — DORZOLAMIDE HYDROCHLORIDE TIMOLOL MALEATE 1 DROP(S): 20; 5 SOLUTION/ DROPS OPHTHALMIC at 22:23

## 2023-06-26 RX ADMIN — Medication 3 MILLIGRAM(S): at 22:23

## 2023-06-26 RX ADMIN — DORZOLAMIDE HYDROCHLORIDE TIMOLOL MALEATE 1 DROP(S): 20; 5 SOLUTION/ DROPS OPHTHALMIC at 13:44

## 2023-06-26 RX ADMIN — Medication 975 MILLIGRAM(S): at 06:56

## 2023-06-26 RX ADMIN — Medication 975 MILLIGRAM(S): at 22:23

## 2023-06-26 RX ADMIN — PANTOPRAZOLE SODIUM 40 MILLIGRAM(S): 20 TABLET, DELAYED RELEASE ORAL at 06:26

## 2023-06-26 RX ADMIN — Medication 975 MILLIGRAM(S): at 06:26

## 2023-06-26 RX ADMIN — Medication 100 MILLIGRAM(S): at 06:27

## 2023-06-26 RX ADMIN — OXYCODONE HYDROCHLORIDE 2.5 MILLIGRAM(S): 5 TABLET ORAL at 03:41

## 2023-06-26 RX ADMIN — ENOXAPARIN SODIUM 40 MILLIGRAM(S): 100 INJECTION SUBCUTANEOUS at 13:45

## 2023-06-26 RX ADMIN — Medication 975 MILLIGRAM(S): at 22:53

## 2023-06-26 NOTE — DISCHARGE NOTE PROVIDER - NSDCCPTREATMENT_GEN_ALL_CORE_FT
PRINCIPAL PROCEDURE  Procedure: Open reduction and internal fixation (ORIF) of right hip with intramedullary nail  Findings and Treatment:

## 2023-06-26 NOTE — PHYSICAL THERAPY INITIAL EVALUATION ADULT - PERTINENT HX OF CURRENT PROBLEM, REHAB EVAL
Pt is an 88 y/o female admitted with R hip pain. PMH: glaucoma. Femur xray shows Acute impacted right femoral intertrochanteric fracture. Pt now s/p ORIF R hip (6/25).

## 2023-06-26 NOTE — DISCHARGE NOTE PROVIDER - DETAILS OF MALNUTRITION DIAGNOSIS/DIAGNOSES
This patient has been assessed with a concern for Malnutrition and was treated during this hospitalization for the following Nutrition diagnosis/diagnoses:     -  06/27/2023: Moderate protein-calorie malnutrition

## 2023-06-26 NOTE — PHYSICAL THERAPY INITIAL EVALUATION ADULT - RANGE OF MOTION EXAMINATION, REHAB EVAL
PROM WFL except R hip limited by pain/swelling/bilateral upper extremity ROM was WFL (within functional limits)/bilateral lower extremity ROM was WFL (within functional limits)

## 2023-06-26 NOTE — DISCHARGE NOTE PROVIDER - NSDCCPCAREPLAN_GEN_ALL_CORE_FT
PRINCIPAL DISCHARGE DIAGNOSIS  Diagnosis: Intertrochanteric fracture of right hip  Assessment and Plan of Treatment:

## 2023-06-26 NOTE — DISCHARGE NOTE PROVIDER - NSDCFUADDINST_GEN_ALL_CORE_FT
Continue weight bearing as tolerated ambulation. Keep surgical incisions/dressings clean and dry. Have dressings/sutures/staples removed and steri-strip applied post operative day#15(7/10/2023)if applicable. Follow up with Dr. Ricardo in his office  3-4 weeks post op.

## 2023-06-26 NOTE — OCCUPATIONAL THERAPY INITIAL EVALUATION ADULT - PERTINENT HX OF CURRENT PROBLEM, REHAB EVAL
88 y/o female admitted with R hip pain. PMH: glaucoma. Femur xray shows Acute impacted right femoral intertrochanteric fracture. Pt now s/p ORIF R hip (6/25).

## 2023-06-26 NOTE — PHYSICAL THERAPY INITIAL EVALUATION ADULT - PRECAUTIONS/LIMITATIONS, REHAB EVAL

## 2023-06-26 NOTE — DISCHARGE NOTE PROVIDER - CARE PROVIDER_API CALL
Karlo Ricardo  Orthopaedic Trauma  [address], [address]  [place] [address]  Phone: [phone]  Fax: [phone]  Follow Up Time:

## 2023-06-26 NOTE — PHYSICAL THERAPY INITIAL EVALUATION ADULT - ADDITIONAL COMMENTS
Pt lives alone in a PH +5 steps +HR and 7 steps downstairs +HR. Pt was independent with all mobility and ADLs prior to admission.

## 2023-06-26 NOTE — DISCHARGE NOTE PROVIDER - NSDCMRMEDTOKEN_GEN_ALL_CORE_FT
brimonidine 0.2% ophthalmic solution: 1 in each affected eye 2 times a day 1 drop in right eye twice daily  dorzolamide-timolol 2.23%-0.68% (2%-0.5% base) ophthalmic solution: 1 in each eye 2 times a day 1 drop each twice daily  latanoprost 0.005% ophthalmic solution: 1 in each eye once a day 1 drop in each eye every evening   acetaminophen 325 mg oral tablet: 3 tab(s) orally every 8 hours as needed for as needed for fever, H/A, mild pain  brimonidine 0.2% ophthalmic solution: 1 in each affected eye 2 times a day 1 drop in right eye twice daily  cholecalciferol oral tablet: 2000 unit(s) orally once a day  dorzolamide-timolol 2.23%-0.68% (2%-0.5% base) ophthalmic solution: 1 in each eye 2 times a day 1 drop each twice daily  enoxaparin: 40 milligram(s) subcutaneous once a day anticoagulation prophylaxis for 1 month post op  latanoprost 0.005% ophthalmic solution: 1 in each eye once a day 1 drop in each eye every evening  pantoprazole 40 mg oral delayed release tablet: 1 tab(s) orally once a day (before a meal)

## 2023-06-26 NOTE — DISCHARGE NOTE PROVIDER - HOSPITAL COURSE
History of Present Illness:   Patient is a 87yFemale home ambulator with assistive devices who presents to ED w/ a c/o of right hip pain. Patient states she fell last night. Denies HH/LOC. States inability to walk immediately following the injury. Denies any numbness or tingling. Denies having any other pain elsewhere. Denies any previous orthopedic/surgical history. No other orthopedic concerns at this time.    PAST MEDICAL HISTORY:  Glaucoma.    HOSPITAL COURSE:  86 y/o FM underwent right hip fracture fixation with IM nail on 6/25/2023 with Dr. Ricardo.  Patient tolerated procedure well.  Patient was evaluated postoperatively by physical and occupational therapists for weight bearing as tolerated ambulation and advised that patient would benefit from admission to rehab facility.  Patient advised to keep surgical incision/dressing clean and dry, and have dressing and surgical staples removed and steri strips applied post op day #14.  Patient further advised to follow up with  _ in _.   History of Present Illness:   Patient is a 87yFemale home ambulator with assistive devices who presents to ED w/ a c/o of right hip pain. Patient states she fell last night. Denies HH/LOC. States inability to walk immediately following the injury. Denies any numbness or tingling. Denies having any other pain elsewhere. Denies any previous orthopedic/surgical history. No other orthopedic concerns at this time.    PAST MEDICAL HISTORY:  Glaucoma.    HOSPITAL COURSE:  88 y/o FM underwent right hip fracture fixation with IM nail on 6/25/2023 with Dr. Ricardo.  Patient tolerated procedure well.  Patient was evaluated postoperatively by physical and occupational therapists for weight bearing as tolerated ambulation and advised that patient would benefit from admission to rehab facility.  Patient advised to keep surgical incision/dressing clean and dry, and have dressing and surgical staples removed and steri strips applied post op day #14(7/9/2023)if applicable.  Patient further advised to follow up with Dr. Ricardo in his office 3-4 weeks post op.

## 2023-06-27 LAB
ANION GAP SERPL CALC-SCNC: 11 MMOL/L — SIGNIFICANT CHANGE UP (ref 5–17)
BUN SERPL-MCNC: 23 MG/DL — SIGNIFICANT CHANGE UP (ref 7–23)
CALCIUM SERPL-MCNC: 7.9 MG/DL — LOW (ref 8.4–10.5)
CHLORIDE SERPL-SCNC: 106 MMOL/L — SIGNIFICANT CHANGE UP (ref 96–108)
CO2 SERPL-SCNC: 22 MMOL/L — SIGNIFICANT CHANGE UP (ref 22–31)
CREAT SERPL-MCNC: 0.76 MG/DL — SIGNIFICANT CHANGE UP (ref 0.5–1.3)
EGFR: 76 ML/MIN/1.73M2 — SIGNIFICANT CHANGE UP
GLUCOSE SERPL-MCNC: 145 MG/DL — HIGH (ref 70–99)
HCT VFR BLD CALC: 31.2 % — LOW (ref 34.5–45)
HGB BLD-MCNC: 10.2 G/DL — LOW (ref 11.5–15.5)
MCHC RBC-ENTMCNC: 30.6 PG — SIGNIFICANT CHANGE UP (ref 27–34)
MCHC RBC-ENTMCNC: 32.7 GM/DL — SIGNIFICANT CHANGE UP (ref 32–36)
MCV RBC AUTO: 93.7 FL — SIGNIFICANT CHANGE UP (ref 80–100)
NRBC # BLD: 0 /100 WBCS — SIGNIFICANT CHANGE UP (ref 0–0)
PLATELET # BLD AUTO: 142 K/UL — LOW (ref 150–400)
POTASSIUM SERPL-MCNC: 4.2 MMOL/L — SIGNIFICANT CHANGE UP (ref 3.5–5.3)
POTASSIUM SERPL-SCNC: 4.2 MMOL/L — SIGNIFICANT CHANGE UP (ref 3.5–5.3)
RBC # BLD: 3.33 M/UL — LOW (ref 3.8–5.2)
RBC # FLD: 15.2 % — HIGH (ref 10.3–14.5)
SARS-COV-2 RNA SPEC QL NAA+PROBE: SIGNIFICANT CHANGE UP
SODIUM SERPL-SCNC: 139 MMOL/L — SIGNIFICANT CHANGE UP (ref 135–145)
WBC # BLD: 7.53 K/UL — SIGNIFICANT CHANGE UP (ref 3.8–10.5)
WBC # FLD AUTO: 7.53 K/UL — SIGNIFICANT CHANGE UP (ref 3.8–10.5)

## 2023-06-27 RX ADMIN — Medication 3 MILLIGRAM(S): at 22:13

## 2023-06-27 RX ADMIN — DORZOLAMIDE HYDROCHLORIDE TIMOLOL MALEATE 1 DROP(S): 20; 5 SOLUTION/ DROPS OPHTHALMIC at 10:41

## 2023-06-27 RX ADMIN — Medication 975 MILLIGRAM(S): at 18:38

## 2023-06-27 RX ADMIN — DORZOLAMIDE HYDROCHLORIDE TIMOLOL MALEATE 1 DROP(S): 20; 5 SOLUTION/ DROPS OPHTHALMIC at 22:12

## 2023-06-27 RX ADMIN — Medication 975 MILLIGRAM(S): at 19:15

## 2023-06-27 RX ADMIN — ENOXAPARIN SODIUM 40 MILLIGRAM(S): 100 INJECTION SUBCUTANEOUS at 14:06

## 2023-06-27 RX ADMIN — Medication 975 MILLIGRAM(S): at 10:41

## 2023-06-27 RX ADMIN — Medication 975 MILLIGRAM(S): at 11:30

## 2023-06-27 NOTE — DIETITIAN INITIAL EVALUATION ADULT - PERSON TAUGHT/METHOD
- Discussed the importance of including adequate calories and proteins throughout the day; reviewed protein calorie dense food sources/ meal and snacks ideas./verbal instruction/patient instructed

## 2023-06-27 NOTE — DIETITIAN INITIAL EVALUATION ADULT - NSFNSPHYEXAMSKINFT_GEN_A_CORE
Pt states UBW "was ~134 pounds and now ~113" ?16% wt loss ?time frame  Dosing wt 110.2 pounds   96% IBW ( pounds)  Skin: no noted pressure injuries as per flowsheets

## 2023-06-27 NOTE — DIETITIAN INITIAL EVALUATION ADULT - PERTINENT MEDS FT
MEDICATIONS  (STANDING):  acetaminophen     Tablet .. 975 milliGRAM(s) Oral every 8 hours  calcium carbonate 1250 mG  + Vitamin D (OsCal 500 + D) 1 Tablet(s) Oral daily  cholecalciferol 2000 Unit(s) Oral daily  dorzolamide 2%/timolol 0.5% Ophthalmic Solution 1 Drop(s) Both EYES two times a day  enoxaparin Injectable 40 milliGRAM(s) SubCutaneous every 24 hours  pantoprazole    Tablet 40 milliGRAM(s) Oral before breakfast  polyethylene glycol 3350 17 Gram(s) Oral daily  senna 2 Tablet(s) Oral at bedtime  sodium chloride 0.9%. 1000 milliLiter(s) (75 mL/Hr) IV Continuous <Continuous>    MEDICATIONS  (PRN):  bisacodyl Suppository 10 milliGRAM(s) Rectal daily PRN If no bowel movement  magnesium hydroxide Suspension 30 milliLiter(s) Oral daily PRN Constipation  melatonin 3 milliGRAM(s) Oral at bedtime PRN Insomnia  ondansetron Injectable 4 milliGRAM(s) IV Push every 6 hours PRN Nausea and/or Vomiting

## 2023-06-27 NOTE — DIETITIAN INITIAL EVALUATION ADULT - NSFNSGIIOFT_GEN_A_CORE
- Pt denies nausea, vomiting; reports sometimes constipation, but "now not"  - Last BM:this am; not currently ordered for bowel regimen  - Pt denies nausea, vomiting; reports sometimes diarrhea, but "now not"  - Last BM:this am; currently ordered for bowel regimen (senna, miralax)

## 2023-06-27 NOTE — DIETITIAN INITIAL EVALUATION ADULT - OTHER INFO
Home Medications:  brimonidine 0.2% ophthalmic solution: 1 in each affected eye 2 times a day 1 drop in right eye twice daily (24 Jun 2023 22:46)  dorzolamide-timolol 2.23%-0.68% (2%-0.5% base) ophthalmic solution: 1 in each eye 2 times a day 1 drop each twice daily (24 Jun 2023 22:45)  latanoprost 0.005% ophthalmic solution: 1 in each eye once a day 1 drop in each eye every evening (24 Jun 2023 22:44)

## 2023-06-27 NOTE — DIETITIAN INITIAL EVALUATION ADULT - PERTINENT LABORATORY DATA
06-26    140  |  107  |  28<H>  ----------------------------<  101<H>  4.0   |  23  |  0.82    Ca    7.9<L>      26 Jun 2023 06:58

## 2023-06-27 NOTE — DIETITIAN INITIAL EVALUATION ADULT - ORAL INTAKE PTA/DIET HISTORY
Pt endorses fluctuating appetite and PO intake PTA.   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

## 2023-06-27 NOTE — DIETITIAN INITIAL EVALUATION ADULT - REASON INDICATOR FOR ASSESSMENT
Consult received for MST score 2 or >   Information obtained from pt, electronic medical record  Chart reviewed, events noted

## 2023-06-27 NOTE — DIETITIAN NUTRITION RISK NOTIFICATION - TREATMENT: THE FOLLOWING DIET HAS BEEN RECOMMENDED
Diet, Regular (06-25-23 @ 13:22) [Active]  Diet, Clear Liquid (06-25-23 @ 12:48) [Available for Activation]

## 2023-06-28 VITALS
OXYGEN SATURATION: 97 % | RESPIRATION RATE: 18 BRPM | HEART RATE: 83 BPM | TEMPERATURE: 98 F | SYSTOLIC BLOOD PRESSURE: 110 MMHG | DIASTOLIC BLOOD PRESSURE: 72 MMHG

## 2023-06-28 LAB
ANION GAP SERPL CALC-SCNC: 10 MMOL/L — SIGNIFICANT CHANGE UP (ref 5–17)
BUN SERPL-MCNC: 19 MG/DL — SIGNIFICANT CHANGE UP (ref 7–23)
CALCIUM SERPL-MCNC: 8.1 MG/DL — LOW (ref 8.4–10.5)
CHLORIDE SERPL-SCNC: 106 MMOL/L — SIGNIFICANT CHANGE UP (ref 96–108)
CO2 SERPL-SCNC: 23 MMOL/L — SIGNIFICANT CHANGE UP (ref 22–31)
CREAT SERPL-MCNC: 0.68 MG/DL — SIGNIFICANT CHANGE UP (ref 0.5–1.3)
EGFR: 85 ML/MIN/1.73M2 — SIGNIFICANT CHANGE UP
GLUCOSE SERPL-MCNC: 104 MG/DL — HIGH (ref 70–99)
HCT VFR BLD CALC: 29.9 % — LOW (ref 34.5–45)
HGB BLD-MCNC: 10.2 G/DL — LOW (ref 11.5–15.5)
MCHC RBC-ENTMCNC: 32.2 PG — SIGNIFICANT CHANGE UP (ref 27–34)
MCHC RBC-ENTMCNC: 34.1 GM/DL — SIGNIFICANT CHANGE UP (ref 32–36)
MCV RBC AUTO: 94.3 FL — SIGNIFICANT CHANGE UP (ref 80–100)
NRBC # BLD: 0 /100 WBCS — SIGNIFICANT CHANGE UP (ref 0–0)
PLATELET # BLD AUTO: 134 K/UL — LOW (ref 150–400)
POTASSIUM SERPL-MCNC: 4 MMOL/L — SIGNIFICANT CHANGE UP (ref 3.5–5.3)
POTASSIUM SERPL-SCNC: 4 MMOL/L — SIGNIFICANT CHANGE UP (ref 3.5–5.3)
RBC # BLD: 3.17 M/UL — LOW (ref 3.8–5.2)
RBC # FLD: 14.6 % — HIGH (ref 10.3–14.5)
SODIUM SERPL-SCNC: 139 MMOL/L — SIGNIFICANT CHANGE UP (ref 135–145)
WBC # BLD: 6.75 K/UL — SIGNIFICANT CHANGE UP (ref 3.8–10.5)
WBC # FLD AUTO: 6.75 K/UL — SIGNIFICANT CHANGE UP (ref 3.8–10.5)

## 2023-06-28 PROCEDURE — 71045 X-RAY EXAM CHEST 1 VIEW: CPT

## 2023-06-28 PROCEDURE — 86923 COMPATIBILITY TEST ELECTRIC: CPT

## 2023-06-28 PROCEDURE — 36415 COLL VENOUS BLD VENIPUNCTURE: CPT

## 2023-06-28 PROCEDURE — 82550 ASSAY OF CK (CPK): CPT

## 2023-06-28 PROCEDURE — 85730 THROMBOPLASTIN TIME PARTIAL: CPT

## 2023-06-28 PROCEDURE — C1769: CPT

## 2023-06-28 PROCEDURE — 80048 BASIC METABOLIC PNL TOTAL CA: CPT

## 2023-06-28 PROCEDURE — 76000 FLUOROSCOPY <1 HR PHYS/QHP: CPT

## 2023-06-28 PROCEDURE — 86901 BLOOD TYPING SEROLOGIC RH(D): CPT

## 2023-06-28 PROCEDURE — 84132 ASSAY OF SERUM POTASSIUM: CPT

## 2023-06-28 PROCEDURE — 0225U NFCT DS DNA&RNA 21 SARSCOV2: CPT

## 2023-06-28 PROCEDURE — 73502 X-RAY EXAM HIP UNI 2-3 VIEWS: CPT

## 2023-06-28 PROCEDURE — 73552 X-RAY EXAM OF FEMUR 2/>: CPT

## 2023-06-28 PROCEDURE — 85610 PROTHROMBIN TIME: CPT

## 2023-06-28 PROCEDURE — P9016: CPT

## 2023-06-28 PROCEDURE — 85018 HEMOGLOBIN: CPT

## 2023-06-28 PROCEDURE — 86850 RBC ANTIBODY SCREEN: CPT

## 2023-06-28 PROCEDURE — 80053 COMPREHEN METABOLIC PANEL: CPT

## 2023-06-28 PROCEDURE — C1889: CPT

## 2023-06-28 PROCEDURE — 82565 ASSAY OF CREATININE: CPT

## 2023-06-28 PROCEDURE — 70450 CT HEAD/BRAIN W/O DYE: CPT | Mod: MA

## 2023-06-28 PROCEDURE — 81001 URINALYSIS AUTO W/SCOPE: CPT

## 2023-06-28 PROCEDURE — 93356 MYOCRD STRAIN IMG SPCKL TRCK: CPT

## 2023-06-28 PROCEDURE — 82435 ASSAY OF BLOOD CHLORIDE: CPT

## 2023-06-28 PROCEDURE — 72192 CT PELVIS W/O DYE: CPT | Mod: MA

## 2023-06-28 PROCEDURE — 97161 PT EVAL LOW COMPLEX 20 MIN: CPT

## 2023-06-28 PROCEDURE — 82330 ASSAY OF CALCIUM: CPT

## 2023-06-28 PROCEDURE — 85014 HEMATOCRIT: CPT

## 2023-06-28 PROCEDURE — 76942 ECHO GUIDE FOR BIOPSY: CPT

## 2023-06-28 PROCEDURE — 93306 TTE W/DOPPLER COMPLETE: CPT

## 2023-06-28 PROCEDURE — 86900 BLOOD TYPING SEROLOGIC ABO: CPT

## 2023-06-28 PROCEDURE — C1713: CPT

## 2023-06-28 PROCEDURE — 76376 3D RENDER W/INTRP POSTPROCES: CPT

## 2023-06-28 PROCEDURE — 85025 COMPLETE CBC W/AUTO DIFF WBC: CPT

## 2023-06-28 PROCEDURE — 99285 EMERGENCY DEPT VISIT HI MDM: CPT | Mod: 25

## 2023-06-28 PROCEDURE — 84484 ASSAY OF TROPONIN QUANT: CPT

## 2023-06-28 PROCEDURE — 73560 X-RAY EXAM OF KNEE 1 OR 2: CPT

## 2023-06-28 PROCEDURE — 97116 GAIT TRAINING THERAPY: CPT

## 2023-06-28 PROCEDURE — 87086 URINE CULTURE/COLONY COUNT: CPT

## 2023-06-28 PROCEDURE — 84295 ASSAY OF SERUM SODIUM: CPT

## 2023-06-28 PROCEDURE — 97165 OT EVAL LOW COMPLEX 30 MIN: CPT

## 2023-06-28 PROCEDURE — 82553 CREATINE MB FRACTION: CPT

## 2023-06-28 PROCEDURE — C9399: CPT

## 2023-06-28 PROCEDURE — 97530 THERAPEUTIC ACTIVITIES: CPT

## 2023-06-28 PROCEDURE — 83605 ASSAY OF LACTIC ACID: CPT

## 2023-06-28 PROCEDURE — 36430 TRANSFUSION BLD/BLD COMPNT: CPT

## 2023-06-28 PROCEDURE — 96374 THER/PROPH/DIAG INJ IV PUSH: CPT

## 2023-06-28 PROCEDURE — 82803 BLOOD GASES ANY COMBINATION: CPT

## 2023-06-28 PROCEDURE — 82947 ASSAY GLUCOSE BLOOD QUANT: CPT

## 2023-06-28 PROCEDURE — 85027 COMPLETE CBC AUTOMATED: CPT

## 2023-06-28 PROCEDURE — 87635 SARS-COV-2 COVID-19 AMP PRB: CPT

## 2023-06-28 RX ORDER — ACETAMINOPHEN 500 MG
3 TABLET ORAL
Qty: 0 | Refills: 0 | DISCHARGE
Start: 2023-06-28

## 2023-06-28 RX ORDER — ENOXAPARIN SODIUM 100 MG/ML
40 INJECTION SUBCUTANEOUS
Qty: 0 | Refills: 0 | DISCHARGE
Start: 2023-06-28

## 2023-06-28 RX ORDER — PANTOPRAZOLE SODIUM 20 MG/1
1 TABLET, DELAYED RELEASE ORAL
Qty: 0 | Refills: 0 | DISCHARGE
Start: 2023-06-28

## 2023-06-28 RX ORDER — CHOLECALCIFEROL (VITAMIN D3) 125 MCG
2000 CAPSULE ORAL
Qty: 0 | Refills: 0 | DISCHARGE
Start: 2023-06-28

## 2023-06-28 RX ADMIN — DORZOLAMIDE HYDROCHLORIDE TIMOLOL MALEATE 1 DROP(S): 20; 5 SOLUTION/ DROPS OPHTHALMIC at 12:27

## 2023-06-28 RX ADMIN — Medication 975 MILLIGRAM(S): at 05:40

## 2023-06-28 RX ADMIN — PANTOPRAZOLE SODIUM 40 MILLIGRAM(S): 20 TABLET, DELAYED RELEASE ORAL at 05:40

## 2023-06-28 RX ADMIN — Medication 2000 UNIT(S): at 11:27

## 2023-06-28 RX ADMIN — ENOXAPARIN SODIUM 40 MILLIGRAM(S): 100 INJECTION SUBCUTANEOUS at 13:54

## 2023-06-28 RX ADMIN — Medication 975 MILLIGRAM(S): at 13:54

## 2023-06-28 RX ADMIN — Medication 975 MILLIGRAM(S): at 06:10

## 2023-06-28 RX ADMIN — Medication 975 MILLIGRAM(S): at 14:50

## 2023-06-28 RX ADMIN — Medication 1 TABLET(S): at 11:27

## 2023-06-28 NOTE — DISCHARGE NOTE NURSING/CASE MANAGEMENT/SOCIAL WORK - PATIENT PORTAL LINK FT
You can access the FollowMyHealth Patient Portal offered by [facility] by registering at the following website: [email or web address]. By joining [facility]’s FollowMyHealth portal, you will also be able to view your health information using other applications (apps) compatible with our system.

## 2023-06-28 NOTE — DISCHARGE NOTE NURSING/CASE MANAGEMENT/SOCIAL WORK - NSDCPEFALRISK_GEN_ALL_CORE
For information on Fall & Injury Prevention, visit: [email or web address] OR  [email or web address] OR  https://www.cdc.gov/steadi/patient.html

## 2023-06-28 NOTE — PROGRESS NOTE ADULT - ASSESSMENT
Impression: Stable       Plan:   Continue present treatment                 Out of bed, ambulate, weight bearing as tolerated                 Physical therapy follow up                 Continue to monitor    Deandre Jimenez PA-C  Orthopaedic Surgery  Team pager [phone]  [phone]   
87 female no known pmhx here s/p fall with right hip fx    right hip fx  ortho following  s/p ORIF  tolerated procedure well  post op care per ortho  pain control  PT    anemia 2/2 acute blood loss  s/p prbc  cont to monitor cbc    elevated CK  suspect 2/2 rhabdo  would trend  ivf  improving     edmundo  unknown baseline  likely pre-renal  iv hydration  resolved     dvt ppx      Advanced care planning was discussed with patient and family.  Advanced care planning forms were reviewed and discussed as appropriate.  Differential diagnosis and plan of care discussed with patient after the evaluation.   Pain assessed and judicious use of narcotics when appropriate was discussed.  Importance of Fall prevention discussed.  Counseling on Smoking and Alcohol cessation was offered when appropriate.  Counseling on Diet, exercise, and medication compliance was done.       Approx 60 minutes spent.
87 female no known pmhx here s/p fall with right hip fx    right hip fx  ortho following  s/p ORIF  tolerated procedure well  post op care per ortho  pain control  PT    anemia 2/2 acute blood loss  s/p prbc  cont to monitor cbc    elevated CK  suspect 2/2 rhabdo  would trend  ivf  improving     edmundo  unknown baseline  likely pre-renal  iv hydration  resolved     dvt ppx    dc planning    Advanced care planning was discussed with patient and family.  Advanced care planning forms were reviewed and discussed as appropriate.  Differential diagnosis and plan of care discussed with patient after the evaluation.   Pain assessed and judicious use of narcotics when appropriate was discussed.  Importance of Fall prevention discussed.  Counseling on Smoking and Alcohol cessation was offered when appropriate.  Counseling on Diet, exercise, and medication compliance was done.       Approx 60 minutes spent.
A/P: 87yFemale S/p R hip IM nail.  Acute blood loss anemia 2/2 fracture.  . VSS. NAD    -PT/OT-WBAT  -1U prbc 6/25  -IS  -DVT PPx  -Pain Control  -Continue Current Tx
87 female no known pmhx here s/p fall with right hip fx    right hip fx  ortho following  s/p ORIF  tolerated procedure well  post op care per ortho  pain control  PT    anemia 2/2 acute blood loss  pt receiving prbc  cont to monitor cbc    elevated CK  suspect 2/2 rhabdo  would trend  ivf  improving     edmundo  unknown baseline  likely pre-renal  iv hydration  resolved     dvt ppx      Advanced care planning was discussed with patient and family.  Advanced care planning forms were reviewed and discussed as appropriate.  Differential diagnosis and plan of care discussed with patient after the evaluation.   Pain assessed and judicious use of narcotics when appropriate was discussed.  Importance of Fall prevention discussed.  Counseling on Smoking and Alcohol cessation was offered when appropriate.  Counseling on Diet, exercise, and medication compliance was done.       Approx 60 minutes spent.
87 female no known pmhx here s/p fall with right hip fx    right hip fx  ortho following  s/p ORIF  tolerated procedure well  post op care per ortho  pain control  PT    anemia 2/2 acute blood loss  s/p prbc  cont to monitor cbc    elevated CK  suspect 2/2 rhabdo  would trend  ivf  improving     edmundo  unknown baseline  likely pre-renal  iv hydration  resolved     dvt ppx    dc planning    Advanced care planning was discussed with patient and family.  Advanced care planning forms were reviewed and discussed as appropriate.  Differential diagnosis and plan of care discussed with patient after the evaluation.   Pain assessed and judicious use of narcotics when appropriate was discussed.  Importance of Fall prevention discussed.  Counseling on Smoking and Alcohol cessation was offered when appropriate.  Counseling on Diet, exercise, and medication compliance was done.       Approx 60 minutes spent.
A/P: 87yFemale with R hip fracture awaiting OR today for IM nail.  VSS. NAD  
86 y/o FM s/p right hip IM nail POD#2, physical therapy, d/c plannning  Rosina Aguilar PA-C  Orthopaedic Surgery  Team pager [phone]  [facility] [phone]  [phone]

## 2023-06-28 NOTE — PROGRESS NOTE ADULT - NUTRITIONAL ASSESSMENT
This patient has been assessed with a concern for Malnutrition and has been determined to have a diagnosis/diagnoses of Moderate protein-calorie malnutrition.    This patient is being managed with:   Diet Regular-  Entered: Jun 25 2023  1:09PM  

## 2023-06-28 NOTE — PROGRESS NOTE ADULT - SUBJECTIVE AND OBJECTIVE BOX
JULIANO SWIFT  87y Female  MRN:[id number]    Patient is a 87y old  Female who presents with a chief complaint of right hip fx    HPI:  Patient is a 87yFemale home ambulator with assistive devices, with no known pmhx, who presents to ED w/ a c/o of right hip pain. Patient states she fell 2 weeks ago. Denies HH/LOC. States inability to walk immediately following the injury. Denies any numbness or tingling. Denies having any other pain elsewhere. Denies any previous orthopedic history. No other orthopedic concerns at this time.  denies chest pain. no sob. no n/v/d. no fevers    (24 Jun 2023 22:41)  pt now s/p Open reduction and internal fixation (ORIF) of right hip with intramedullary nail       Patient seen and evaluated at bedside.    Interval HPI:  no acute events o/n     PAST MEDICAL & SURGICAL HISTORY:  Glaucoma          REVIEW OF SYSTEMS:  Constitutional: No fever, chills, fatigue or weight loss.  Skin: No rash.  Eyes: No recent vision problems or eye pain.  ENT: No congestion, ear pain, or sore throat.  Endocrine: No thyroid problems.  Cardiovascular: No chest pain or palpation.  Respiratory: No cough, shortness of breath, congestion, or wheezing.  Gastrointestinal: No abdominal pain, nausea, vomiting, or diarrhea.  Genitourinary: No dysuria.  Musculoskeletal: a per hpi  Neurologic: No headache.    VITALS:   Vital Signs Last 24 Hrs  T(C): 36.4 (27 Jun 2023 05:07), Max: 36.6 (26 Jun 2023 13:49)  T(F): 97.5 (27 Jun 2023 05:07), Max: 97.9 (26 Jun 2023 13:49)  HR: 70 (27 Jun 2023 05:07) (60 - 78)  BP: 160/84 (27 Jun 2023 05:07) (122/63 - 160/84)  BP(mean): --  RR: 18 (27 Jun 2023 05:07) (18 - 18)  SpO2: 98% (27 Jun 2023 05:07) (97% - 100%)    Parameters below as of 27 Jun 2023 05:07  Patient On (Oxygen Delivery Method): room air          PHYSICAL EXAM:  GENERAL: NAD, well-developed  HEAD:  Atraumatic, Normocephalic  EYES: EOMI, PERRLA, conjunctiva and sclera clear  NECK: Supple, No JVD  CHEST/LUNG: Clear to auscultation bilaterally; No wheeze  HEART: S1, S2; No murmurs, rubs, or gallops  ABDOMEN: Soft, Nontender, Nondistended; Bowel sounds present  EXTREMITIES:  2+ Peripheral Pulses, No clubbing, cyanosis, or edema  PSYCH: Normal affect  NEUROLOGY: AAOX3; non-focal  SKIN: No rashes or lesions    Consultant(s) Notes Reviewed:  [x ] YES  [ ] NO  Care Discussed with Consultants/Other Providers [ x] YES  [ ] NO    MEDS:    MEDICATIONS  (STANDING):  acetaminophen     Tablet .. 975 milliGRAM(s) Oral every 8 hours  calcium carbonate 1250 mG  + Vitamin D (OsCal 500 + D) 1 Tablet(s) Oral daily  cholecalciferol 2000 Unit(s) Oral daily  dorzolamide 2%/timolol 0.5% Ophthalmic Solution 1 Drop(s) Both EYES two times a day  enoxaparin Injectable 40 milliGRAM(s) SubCutaneous every 24 hours  pantoprazole    Tablet 40 milliGRAM(s) Oral before breakfast  polyethylene glycol 3350 17 Gram(s) Oral daily  senna 2 Tablet(s) Oral at bedtime  sodium chloride 0.9%. 1000 milliLiter(s) (75 mL/Hr) IV Continuous <Continuous>    MEDICATIONS  (PRN):  bisacodyl Suppository 10 milliGRAM(s) Rectal daily PRN If no bowel movement  magnesium hydroxide Suspension 30 milliLiter(s) Oral daily PRN Constipation  melatonin 3 milliGRAM(s) Oral at bedtime PRN Insomnia  ondansetron Injectable 4 milliGRAM(s) IV Push every 6 hours PRN Nausea and/or Vomiting      ALLERGIES:  No Known Allergies      LABS:                                       10.2   7.53  )-----------( 142      ( 27 Jun 2023 10:49 )             31.2   06-27    139  |  106  |  23  ----------------------------<  145<H>  4.2   |  22  |  0.76    Ca    7.9<L>      27 Jun 2023 10:49         < from: Xray Chest 1 View- PORTABLE-Urgent (Xray Chest 1 View- PORTABLE-Urgent .) (06.24.23 @ 18:54) >    IMPRESSION:  No acute traumatic findings.        < end of copied text >  < from: Xray Femur 2 Views, Right (06.24.23 @ 18:54) >  IMPRESSION:  Acute comminuted right intertrochanteric fracture. Impaction of the   primary components with displacement of the lesser trochanteric component.  Mild bilateral hip joint space narrowing. Degenerative changes of the   lumbar spine.    < end of copied text >     EKG: NSR. inferior changes 
JULIANO SWIFT  87y Female  MRN:[id number]    Patient is a 87y old  Female who presents with a chief complaint of right hip fx    HPI:  Patient is a 87yFemale home ambulator with assistive devices, with no known pmhx, who presents to ED w/ a c/o of right hip pain. Patient states she fell 2 weeks ago. Denies HH/LOC. States inability to walk immediately following the injury. Denies any numbness or tingling. Denies having any other pain elsewhere. Denies any previous orthopedic history. No other orthopedic concerns at this time.  denies chest pain. no sob. no n/v/d. no fevers    (24 Jun 2023 22:41)  pt now s/p Open reduction and internal fixation (ORIF) of right hip with intramedullary nail       Patient seen and evaluated at bedside.    Interval HPI:  no acute events o/n     PAST MEDICAL & SURGICAL HISTORY:  Glaucoma          REVIEW OF SYSTEMS:  Constitutional: No fever, chills, fatigue or weight loss.  Skin: No rash.  Eyes: No recent vision problems or eye pain.  ENT: No congestion, ear pain, or sore throat.  Endocrine: No thyroid problems.  Cardiovascular: No chest pain or palpation.  Respiratory: No cough, shortness of breath, congestion, or wheezing.  Gastrointestinal: No abdominal pain, nausea, vomiting, or diarrhea.  Genitourinary: No dysuria.  Musculoskeletal: a per hpi  Neurologic: No headache.    VITALS:   Vital Signs Last 24 Hrs  T(C): 36.9 (28 Jun 2023 13:22), Max: 36.9 (28 Jun 2023 13:22)  T(F): 98.5 (28 Jun 2023 13:22), Max: 98.5 (28 Jun 2023 13:22)  HR: 83 (28 Jun 2023 13:22) (65 - 88)  BP: 110/72 (28 Jun 2023 13:22) (90/60 - 145/70)  BP(mean): --  RR: 18 (28 Jun 2023 13:22) (18 - 18)  SpO2: 97% (28 Jun 2023 13:22) (96% - 99%)    Parameters below as of 28 Jun 2023 13:22  Patient On (Oxygen Delivery Method): room air          PHYSICAL EXAM:  GENERAL: NAD, well-developed  HEAD:  Atraumatic, Normocephalic  EYES: EOMI, PERRLA, conjunctiva and sclera clear  NECK: Supple, No JVD  CHEST/LUNG: Clear to auscultation bilaterally; No wheeze  HEART: S1, S2; No murmurs, rubs, or gallops  ABDOMEN: Soft, Nontender, Nondistended; Bowel sounds present  EXTREMITIES:  2+ Peripheral Pulses, No clubbing, cyanosis, or edema  PSYCH: Normal affect  NEUROLOGY: AAOX3; non-focal  SKIN: No rashes or lesions    Consultant(s) Notes Reviewed:  [x ] YES  [ ] NO  Care Discussed with Consultants/Other Providers [ x] YES  [ ] NO    MEDS:   MEDICATIONS  (STANDING):  acetaminophen     Tablet .. 975 milliGRAM(s) Oral every 8 hours  calcium carbonate 1250 mG  + Vitamin D (OsCal 500 + D) 1 Tablet(s) Oral daily  cholecalciferol 2000 Unit(s) Oral daily  dorzolamide 2%/timolol 0.5% Ophthalmic Solution 1 Drop(s) Both EYES two times a day  enoxaparin Injectable 40 milliGRAM(s) SubCutaneous every 24 hours  pantoprazole    Tablet 40 milliGRAM(s) Oral before breakfast  polyethylene glycol 3350 17 Gram(s) Oral daily  senna 2 Tablet(s) Oral at bedtime  sodium chloride 0.9%. 1000 milliLiter(s) (75 mL/Hr) IV Continuous <Continuous>    MEDICATIONS  (PRN):  bisacodyl Suppository 10 milliGRAM(s) Rectal daily PRN If no bowel movement  magnesium hydroxide Suspension 30 milliLiter(s) Oral daily PRN Constipation  melatonin 3 milliGRAM(s) Oral at bedtime PRN Insomnia  ondansetron Injectable 4 milliGRAM(s) IV Push every 6 hours PRN Nausea and/or Vomiting      ALLERGIES:  No Known Allergies      LABS:                                               10.2   6.75  )-----------( 134      ( 28 Jun 2023 06:09 )             29.9   06-28    139  |  106  |  19  ----------------------------<  104<H>  4.0   |  23  |  0.68    Ca    8.1<L>      28 Jun 2023 06:09           < from: Xray Chest 1 View- PORTABLE-Urgent (Xray Chest 1 View- PORTABLE-Urgent .) (06.24.23 @ 18:54) >    IMPRESSION:  No acute traumatic findings.        < end of copied text >  < from: Xray Femur 2 Views, Right (06.24.23 @ 18:54) >  IMPRESSION:  Acute comminuted right intertrochanteric fracture. Impaction of the   primary components with displacement of the lesser trochanteric component.  Mild bilateral hip joint space narrowing. Degenerative changes of the   lumbar spine.    < end of copied text >     EKG: NSR. inferior changes 
JULIANO SWIFT  87y Female  MRN:[id number]    Patient is a 87y old  Female who presents with a chief complaint of right hip fx    HPI:  Patient is a 87yFemale home ambulator with assistive devices, with no known pmhx, who presents to ED w/ a c/o of right hip pain. Patient states she fell 2 weeks ago. Denies HH/LOC. States inability to walk immediately following the injury. Denies any numbness or tingling. Denies having any other pain elsewhere. Denies any previous orthopedic history. No other orthopedic concerns at this time.  denies chest pain. no sob. no n/v/d. no fevers    (24 Jun 2023 22:41)      Patient seen and evaluated at bedside.    Interval HPI:  s/p Open reduction and internal fixation (ORIF) of right hip with intramedullary nail this AM      PAST MEDICAL & SURGICAL HISTORY:  Glaucoma          REVIEW OF SYSTEMS:  Constitutional: No fever, chills, fatigue or weight loss.  Skin: No rash.  Eyes: No recent vision problems or eye pain.  ENT: No congestion, ear pain, or sore throat.  Endocrine: No thyroid problems.  Cardiovascular: No chest pain or palpation.  Respiratory: No cough, shortness of breath, congestion, or wheezing.  Gastrointestinal: No abdominal pain, nausea, vomiting, or diarrhea.  Genitourinary: No dysuria.  Musculoskeletal: a per hpi  Neurologic: No headache.    VITALS:   Vital Signs Last 24 Hrs  T(C): 37.3 (25 Jun 2023 18:15), Max: 37.3 (25 Jun 2023 18:15)  T(F): 99.2 (25 Jun 2023 18:15), Max: 99.2 (25 Jun 2023 18:15)  HR: 77 (25 Jun 2023 18:15) (65 - 116)  BP: 115/67 (25 Jun 2023 18:15) (94/59 - 164/67)  BP(mean): 89 (25 Jun 2023 15:00) (71 - 103)  RR: 18 (25 Jun 2023 18:15) (12 - 18)  SpO2: 98% (25 Jun 2023 18:15) (95% - 100%)    Parameters below as of 25 Jun 2023 18:15  Patient On (Oxygen Delivery Method): room air          PHYSICAL EXAM:  GENERAL: NAD, well-developed  HEAD:  Atraumatic, Normocephalic  EYES: EOMI, PERRLA, conjunctiva and sclera clear  NECK: Supple, No JVD  CHEST/LUNG: Clear to auscultation bilaterally; No wheeze  HEART: S1, S2; No murmurs, rubs, or gallops  ABDOMEN: Soft, Nontender, Nondistended; Bowel sounds present  EXTREMITIES:  2+ Peripheral Pulses, No clubbing, cyanosis, or edema  PSYCH: Normal affect  NEUROLOGY: AAOX3; non-focal  SKIN: No rashes or lesions    Consultant(s) Notes Reviewed:  [x ] YES  [ ] NO  Care Discussed with Consultants/Other Providers [ x] YES  [ ] NO    MEDS:   MEDICATIONS  (STANDING):  acetaminophen     Tablet .. 975 milliGRAM(s) Oral every 8 hours  calcium carbonate 1250 mG  + Vitamin D (OsCal 500 + D) 1 Tablet(s) Oral daily  ceFAZolin   IVPB 2000 milliGRAM(s) IV Intermittent every 8 hours  cholecalciferol 2000 Unit(s) Oral daily  dorzolamide 2%/timolol 0.5% Ophthalmic Solution 1 Drop(s) Both EYES two times a day  pantoprazole    Tablet 40 milliGRAM(s) Oral before breakfast  polyethylene glycol 3350 17 Gram(s) Oral daily  senna 2 Tablet(s) Oral at bedtime  sodium chloride 0.9%. 1000 milliLiter(s) (75 mL/Hr) IV Continuous <Continuous>    MEDICATIONS  (PRN):  magnesium hydroxide Suspension 30 milliLiter(s) Oral daily PRN Constipation  melatonin 3 milliGRAM(s) Oral at bedtime PRN Insomnia  ondansetron Injectable 4 milliGRAM(s) IV Push every 6 hours PRN Nausea and/or Vomiting  oxyCODONE    IR 2.5 milliGRAM(s) Oral every 4 hours PRN Moderate Pain (4 - 6)  oxyCODONE    IR 5 milliGRAM(s) Oral every 4 hours PRN Severe Pain (7 - 10)      ALLERGIES:  No Known Allergies      LABS:                                    7.8    9.31  )-----------( 140      ( 25 Jun 2023 13:39 )             24.7   06-25    139  |  107  |  48<H>  ----------------------------<  123<H>  3.9   |  22  |  1.10    Ca    7.5<L>      25 Jun 2023 13:39    TPro  6.8  /  Alb  4.0  /  TBili  1.2  /  DBili  x   /  AST  67<H>  /  ALT  34  /  AlkPhos  68  06-24       < from: Xray Chest 1 View- PORTABLE-Urgent (Xray Chest 1 View- PORTABLE-Urgent .) (06.24.23 @ 18:54) >    IMPRESSION:  No acute traumatic findings.        < end of copied text >  < from: Xray Femur 2 Views, Right (06.24.23 @ 18:54) >  IMPRESSION:  Acute comminuted right intertrochanteric fracture. Impaction of the   primary components with displacement of the lesser trochanteric component.  Mild bilateral hip joint space narrowing. Degenerative changes of the   lumbar spine.    < end of copied text >     EKG: NSR. inferior changes 
JULIANO SWIFT  87y Female  MRN:[id number]    Patient is a 87y old  Female who presents with a chief complaint of right hip fx    HPI:  Patient is a 87yFemale home ambulator with assistive devices, with no known pmhx, who presents to ED w/ a c/o of right hip pain. Patient states she fell 2 weeks ago. Denies HH/LOC. States inability to walk immediately following the injury. Denies any numbness or tingling. Denies having any other pain elsewhere. Denies any previous orthopedic history. No other orthopedic concerns at this time.  denies chest pain. no sob. no n/v/d. no fevers    (24 Jun 2023 22:41)  pt now s/p Open reduction and internal fixation (ORIF) of right hip with intramedullary nail       Patient seen and evaluated at bedside.    Interval HPI:  no acute events o/n     PAST MEDICAL & SURGICAL HISTORY:  Glaucoma          REVIEW OF SYSTEMS:  Constitutional: No fever, chills, fatigue or weight loss.  Skin: No rash.  Eyes: No recent vision problems or eye pain.  ENT: No congestion, ear pain, or sore throat.  Endocrine: No thyroid problems.  Cardiovascular: No chest pain or palpation.  Respiratory: No cough, shortness of breath, congestion, or wheezing.  Gastrointestinal: No abdominal pain, nausea, vomiting, or diarrhea.  Genitourinary: No dysuria.  Musculoskeletal: a per hpi  Neurologic: No headache.    VITALS:   Vital Signs Last 24 Hrs  T(C): 36.9 (26 Jun 2023 06:32), Max: 37.3 (25 Jun 2023 18:15)  T(F): 98.4 (26 Jun 2023 06:32), Max: 99.2 (25 Jun 2023 18:15)  HR: 64 (26 Jun 2023 09:20) (64 - 116)  BP: 128/72 (26 Jun 2023 09:20) (99/58 - 164/67)  BP(mean): 89 (25 Jun 2023 15:00) (77 - 103)  RR: 18 (26 Jun 2023 06:32) (12 - 18)  SpO2: 97% (26 Jun 2023 09:20) (96% - 100%)    Parameters below as of 26 Jun 2023 09:20  Patient On (Oxygen Delivery Method): room air        PHYSICAL EXAM:  GENERAL: NAD, well-developed  HEAD:  Atraumatic, Normocephalic  EYES: EOMI, PERRLA, conjunctiva and sclera clear  NECK: Supple, No JVD  CHEST/LUNG: Clear to auscultation bilaterally; No wheeze  HEART: S1, S2; No murmurs, rubs, or gallops  ABDOMEN: Soft, Nontender, Nondistended; Bowel sounds present  EXTREMITIES:  2+ Peripheral Pulses, No clubbing, cyanosis, or edema  PSYCH: Normal affect  NEUROLOGY: AAOX3; non-focal  SKIN: No rashes or lesions    Consultant(s) Notes Reviewed:  [x ] YES  [ ] NO  Care Discussed with Consultants/Other Providers [ x] YES  [ ] NO    MEDS:   MEDICATIONS  (STANDING):  acetaminophen     Tablet .. 975 milliGRAM(s) Oral every 8 hours  calcium carbonate 1250 mG  + Vitamin D (OsCal 500 + D) 1 Tablet(s) Oral daily  cholecalciferol 2000 Unit(s) Oral daily  dorzolamide 2%/timolol 0.5% Ophthalmic Solution 1 Drop(s) Both EYES two times a day  enoxaparin Injectable 40 milliGRAM(s) SubCutaneous every 24 hours  pantoprazole    Tablet 40 milliGRAM(s) Oral before breakfast  polyethylene glycol 3350 17 Gram(s) Oral daily  senna 2 Tablet(s) Oral at bedtime  sodium chloride 0.9%. 1000 milliLiter(s) (75 mL/Hr) IV Continuous <Continuous>    MEDICATIONS  (PRN):  magnesium hydroxide Suspension 30 milliLiter(s) Oral daily PRN Constipation  melatonin 3 milliGRAM(s) Oral at bedtime PRN Insomnia  ondansetron Injectable 4 milliGRAM(s) IV Push every 6 hours PRN Nausea and/or Vomiting  oxyCODONE    IR 2.5 milliGRAM(s) Oral every 4 hours PRN Moderate Pain (4 - 6)  oxyCODONE    IR 5 milliGRAM(s) Oral every 4 hours PRN Severe Pain (7 - 10)        ALLERGIES:  No Known Allergies      LABS:                                              9.6    8.49  )-----------( 107      ( 26 Jun 2023 07:07 )             29.1   06-26    140  |  107  |  28<H>  ----------------------------<  101<H>  4.0   |  23  |  0.82    Ca    7.9<L>      26 Jun 2023 06:58    TPro  6.8  /  Alb  4.0  /  TBili  1.2  /  DBili  x   /  AST  67<H>  /  ALT  34  /  AlkPhos  68  06-24       < from: Xray Chest 1 View- PORTABLE-Urgent (Xray Chest 1 View- PORTABLE-Urgent .) (06.24.23 @ 18:54) >    IMPRESSION:  No acute traumatic findings.        < end of copied text >  < from: Xray Femur 2 Views, Right (06.24.23 @ 18:54) >  IMPRESSION:  Acute comminuted right intertrochanteric fracture. Impaction of the   primary components with displacement of the lesser trochanteric component.  Mild bilateral hip joint space narrowing. Degenerative changes of the   lumbar spine.    < end of copied text >     EKG: NSR. inferior changes 
ORTHOPAEDICS DAILY PROGRESS NOTE:       SUBJECTIVE/ROS: POD 1. Seen and examined. Pain well controlled. Needs to work with PT. Denies CP/SOB/N/V.         MEDICATIONS  (STANDING):  acetaminophen     Tablet .. 975 milliGRAM(s) Oral every 8 hours  calcium carbonate 1250 mG  + Vitamin D (OsCal 500 + D) 1 Tablet(s) Oral daily  cholecalciferol 2000 Unit(s) Oral daily  dorzolamide 2%/timolol 0.5% Ophthalmic Solution 1 Drop(s) Both EYES two times a day  enoxaparin Injectable 40 milliGRAM(s) SubCutaneous every 24 hours  pantoprazole    Tablet 40 milliGRAM(s) Oral before breakfast  polyethylene glycol 3350 17 Gram(s) Oral daily  senna 2 Tablet(s) Oral at bedtime  sodium chloride 0.9%. 1000 milliLiter(s) (75 mL/Hr) IV Continuous <Continuous>    MEDICATIONS  (PRN):  magnesium hydroxide Suspension 30 milliLiter(s) Oral daily PRN Constipation  melatonin 3 milliGRAM(s) Oral at bedtime PRN Insomnia  ondansetron Injectable 4 milliGRAM(s) IV Push every 6 hours PRN Nausea and/or Vomiting  oxyCODONE    IR 2.5 milliGRAM(s) Oral every 4 hours PRN Moderate Pain (4 - 6)  oxyCODONE    IR 5 milliGRAM(s) Oral every 4 hours PRN Severe Pain (7 - 10)      OBJECTIVE:    Vital Signs Last 24 Hrs  T(C): 36.9 (26 Jun 2023 06:32), Max: 37.3 (25 Jun 2023 18:15)  T(F): 98.4 (26 Jun 2023 06:32), Max: 99.2 (25 Jun 2023 18:15)  HR: 71 (26 Jun 2023 06:32) (65 - 116)  BP: 153/73 (26 Jun 2023 06:32) (99/58 - 164/67)  BP(mean): 89 (25 Jun 2023 15:00) (71 - 103)  RR: 18 (26 Jun 2023 06:32) (12 - 18)  SpO2: 97% (26 Jun 2023 06:32) (96% - 100%)    Parameters below as of 26 Jun 2023 06:32  Patient On (Oxygen Delivery Method): room air        I&O's Detail    24 Jun 2023 07:01  -  25 Jun 2023 07:00  --------------------------------------------------------  IN:  Total IN: 0 mL    OUT:    Post-Void Residual per Intermittent Catheterization (mL): 50 mL    Voided (mL): 500 mL  Total OUT: 550 mL    Total NET: -550 mL      25 Jun 2023 07:01  -  26 Jun 2023 06:54  --------------------------------------------------------  IN:    IV PiggyBack: 160 mL    Oral Fluid: 400 mL    sodium chloride 0.9%: 225 mL  Total IN: 785 mL    OUT:    Indwelling Catheter - Urethral (mL): 1100 mL    Post-Void Residual per Intermittent Catheterization (mL): 381 mL  Total OUT: 1481 mL    Total NET: -696 mL          Daily Height in cm: 160.02 (25 Jun 2023 10:48)    Daily     LABS:                        7.8    9.31  )-----------( 140      ( 25 Jun 2023 13:39 )             24.7     06-25    139  |  107  |  48<H>  ----------------------------<  123<H>  3.9   |  22  |  1.10    Ca    7.5<L>      25 Jun 2023 13:39    TPro  6.8  /  Alb  4.0  /  TBili  1.2  /  DBili  x   /  AST  67<H>  /  ALT  34  /  AlkPhos  68  06-24    PT/INR - ( 25 Jun 2023 04:09 )   PT: 11.4 sec;   INR: 0.99 ratio         PTT - ( 25 Jun 2023 04:09 )  PTT:23.8 sec  Urinalysis Basic - ( 25 Jun 2023 13:39 )    Color: x / Appearance: x / SG: x / pH: x  Gluc: 123 mg/dL / Ketone: x  / Bili: x / Urobili: x   Blood: x / Protein: x / Nitrite: x   Leuk Esterase: x / RBC: x / WBC x   Sq Epi: x / Non Sq Epi: x / Bacteria: x                PHYSICAL EXAM:  nad  nonlabored resp  rle  dressing c/d/i  +gastroc/ta/ehl/fhl  silt s/s/sp/dp/t  +dp  
Patient is a 87y old  Female who presents with a chief complaint of Right hip fracture  Patient s/p right hip fracture fixation with IM nail POD#2  Patient comfortable  No complaints    T(C): 36.4 (06-27-23 @ 05:07), Max: 36.9 (06-26-23 @ 09:20)  HR: 70 (06-27-23 @ 05:07) (60 - 78)  BP: 160/84 (06-27-23 @ 05:07) (122/63 - 160/84)  RR: 18 (06-27-23 @ 05:07) (18 - 18)  SpO2: 98% (06-27-23 @ 05:07) (97% - 100%)    PHYSICAL EXAM:  NAD, Alert  [Right ] Hip: Dressings C/D/I; sensation grossly intact to light touch; (+) DF/PF; (+) Distal Pulses; No Calf tenderness B/L, PAS   LABS:                     9.6    8.49  )-----------( 107      ( 26 Jun 2023 07:07 )             29.1   06-26  140  |  107  |  28<H>  ----------------------------<  101<H>  4.0   |  23  |  0.82  Ca    7.9<L>      26 Jun 2023 06:58      
 ORTHO  Patient is a 87y old  Female who presents with a chief complaint of Displaced intertrochanteric fracture of right femur, initial encounter for closed fracture     (27 Jun 2023 10:37)    Pt. resting without complaint    VS-  T(C): 36.5 (06-28-23 @ 04:48), Max: 36.9 (06-27-23 @ 12:34)  HR: 70 (06-28-23 @ 04:48) (63 - 70)  BP: 132/89 (06-28-23 @ 04:48) (119/72 - 145/70)  RR: 18 (06-28-23 @ 04:48) (18 - 18)  SpO2: 96% (06-28-23 @ 04:48) (96% - 99%)  Wt(kg): --    M.S. A&O  Extremity- Right hip dressings C/D/I  Neuro-              Motor- (+)Ankle DF/PF              Sensation- grossly intact to light touch              Calves- soft, nontender- PAS                               10.2   6.75  )-----------( 134      ( 28 Jun 2023 06:09 )             29.9     06-28    139  |  106  |  19  ----------------------------<  104<H>  4.0   |  23  |  0.68    Ca    8.1<L>      28 Jun 2023 06:09                           
Resting without complaints.  No Chest Pain, SOB, N/V.    T(C): 36.9 (06-25-23 @ 04:39), Max: 36.9 (06-24-23 @ 17:56)  HR: 85 (06-25-23 @ 04:39) (77 - 100)  BP: 125/85 (06-25-23 @ 04:39) (94/59 - 138/65)  RR: 16 (06-25-23 @ 04:39) (16 - 18)  SpO2: 99% (06-25-23 @ 04:39) (95% - 100%)  Wt(kg): --    Exam:  Alert and McWilliams, No Acute Distress  R hip with soft/compressible compartments  Calves soft, non-tender bilaterally  +PF/DF/EHL/FHL  SILT  + DP pulse                          8.9    10.35 )-----------( 156      ( 25 Jun 2023 04:09 )             26.4    06-25    139  |  105  |  56<H>  ----------------------------<  125<H>  4.3   |  23  |  1.56<H>    Ca    8.2<L>      25 Jun 2023 04:09    TPro  6.8  /  Alb  4.0  /  TBili  1.2  /  DBili  x   /  AST  67<H>  /  ALT  34  /  AlkPhos  68  06-24

## 2023-06-28 NOTE — PROGRESS NOTE ADULT - PROVIDER SPECIALTY LIST ADULT
Internal Medicine
Orthopedics
Internal Medicine
Orthopedics
Internal Medicine
Orthopedics
Internal Medicine
Orthopedics

## 2024-08-31 ENCOUNTER — INPATIENT (INPATIENT)
Facility: HOSPITAL | Age: 89
LOS: 3 days | Discharge: NURSING, REHAB OR LONG-TERM CARE | DRG: 536 | End: 2024-09-04
Attending: GENERAL ACUTE CARE HOSPITAL | Admitting: GENERAL ACUTE CARE HOSPITAL
Payer: COMMERCIAL

## 2024-08-31 VITALS
RESPIRATION RATE: 18 BRPM | DIASTOLIC BLOOD PRESSURE: 72 MMHG | TEMPERATURE: 98 F | HEART RATE: 69 BPM | OXYGEN SATURATION: 100 % | SYSTOLIC BLOOD PRESSURE: 109 MMHG

## 2024-08-31 PROCEDURE — 99285 EMERGENCY DEPT VISIT HI MDM: CPT

## 2024-08-31 NOTE — ED ADULT TRIAGE NOTE - DIRECT TO ROOM CARE INITIATED:
Jim Epifanio is to return to the clinic to see in Maggie Pitts MD  1 year for a 20 min OFV for follow-up     Referral:  None    Labs to be done day of next visit:  None     Labs to be done within 5 days prior to next visit:  CBC with diff  CMP  Iron/tibc  Ferritin  Immunoelectrophoresis        Imaging to be done within a week prior to next visit:  None    Labs to be done now:  None     Imaging to be done now:  None    Misc Orders:  None    Diagnosis    1.  Neutropenia, unspecified type (CMD)    2. Normocytic anemia        Mica Antonio PA-C
31-Aug-2024 23:43

## 2024-08-31 NOTE — ED ADULT TRIAGE NOTE - CHIEF COMPLAINT QUOTE
slip and fall at home; c/o l hip pain; unable to stand after fall; not on blood thinners no pmhx as per ems and family; denies head injury or loc

## 2024-09-01 DIAGNOSIS — Z01.818 ENCOUNTER FOR OTHER PREPROCEDURAL EXAMINATION: ICD-10-CM

## 2024-09-01 DIAGNOSIS — Z29.9 ENCOUNTER FOR PROPHYLACTIC MEASURES, UNSPECIFIED: ICD-10-CM

## 2024-09-01 DIAGNOSIS — S72.002A FRACTURE OF UNSPECIFIED PART OF NECK OF LEFT FEMUR, INITIAL ENCOUNTER FOR CLOSED FRACTURE: ICD-10-CM

## 2024-09-01 DIAGNOSIS — H40.9 UNSPECIFIED GLAUCOMA: ICD-10-CM

## 2024-09-01 DIAGNOSIS — S72.009A FRACTURE OF UNSPECIFIED PART OF NECK OF UNSPECIFIED FEMUR, INITIAL ENCOUNTER FOR CLOSED FRACTURE: ICD-10-CM

## 2024-09-01 LAB
ALBUMIN SERPL ELPH-MCNC: 3.8 G/DL — SIGNIFICANT CHANGE UP (ref 3.3–5)
ALP SERPL-CCNC: 82 U/L — SIGNIFICANT CHANGE UP (ref 40–120)
ALT FLD-CCNC: 17 U/L — SIGNIFICANT CHANGE UP (ref 10–45)
ANION GAP SERPL CALC-SCNC: 11 MMOL/L — SIGNIFICANT CHANGE UP (ref 5–17)
ANION GAP SERPL CALC-SCNC: 13 MMOL/L — SIGNIFICANT CHANGE UP (ref 5–17)
APTT BLD: 29.3 SEC — SIGNIFICANT CHANGE UP (ref 24.5–35.6)
AST SERPL-CCNC: 21 U/L — SIGNIFICANT CHANGE UP (ref 10–40)
BASOPHILS # BLD AUTO: 0.06 K/UL — SIGNIFICANT CHANGE UP (ref 0–0.2)
BASOPHILS NFR BLD AUTO: 0.8 % — SIGNIFICANT CHANGE UP (ref 0–2)
BILIRUB SERPL-MCNC: 0.2 MG/DL — SIGNIFICANT CHANGE UP (ref 0.2–1.2)
BLD GP AB SCN SERPL QL: NEGATIVE — SIGNIFICANT CHANGE UP
BUN SERPL-MCNC: 23 MG/DL — SIGNIFICANT CHANGE UP (ref 7–23)
BUN SERPL-MCNC: 28 MG/DL — HIGH (ref 7–23)
CALCIUM SERPL-MCNC: 8.7 MG/DL — SIGNIFICANT CHANGE UP (ref 8.4–10.5)
CALCIUM SERPL-MCNC: 9.2 MG/DL — SIGNIFICANT CHANGE UP (ref 8.4–10.5)
CHLORIDE SERPL-SCNC: 103 MMOL/L — SIGNIFICANT CHANGE UP (ref 96–108)
CHLORIDE SERPL-SCNC: 104 MMOL/L — SIGNIFICANT CHANGE UP (ref 96–108)
CO2 SERPL-SCNC: 22 MMOL/L — SIGNIFICANT CHANGE UP (ref 22–31)
CO2 SERPL-SCNC: 26 MMOL/L — SIGNIFICANT CHANGE UP (ref 22–31)
CREAT SERPL-MCNC: 0.77 MG/DL — SIGNIFICANT CHANGE UP (ref 0.5–1.3)
CREAT SERPL-MCNC: 0.9 MG/DL — SIGNIFICANT CHANGE UP (ref 0.5–1.3)
EGFR: 62 ML/MIN/1.73M2 — SIGNIFICANT CHANGE UP
EGFR: 75 ML/MIN/1.73M2 — SIGNIFICANT CHANGE UP
EOSINOPHIL # BLD AUTO: 0.32 K/UL — SIGNIFICANT CHANGE UP (ref 0–0.5)
EOSINOPHIL NFR BLD AUTO: 4.2 % — SIGNIFICANT CHANGE UP (ref 0–6)
GLUCOSE SERPL-MCNC: 112 MG/DL — HIGH (ref 70–99)
GLUCOSE SERPL-MCNC: 135 MG/DL — HIGH (ref 70–99)
HCT VFR BLD CALC: 33.3 % — LOW (ref 34.5–45)
HCT VFR BLD CALC: 33.6 % — LOW (ref 34.5–45)
HGB BLD-MCNC: 10.9 G/DL — LOW (ref 11.5–15.5)
HGB BLD-MCNC: 10.9 G/DL — LOW (ref 11.5–15.5)
IMM GRANULOCYTES NFR BLD AUTO: 0.5 % — SIGNIFICANT CHANGE UP (ref 0–0.9)
INR BLD: 0.85 RATIO — SIGNIFICANT CHANGE UP (ref 0.85–1.18)
LYMPHOCYTES # BLD AUTO: 0.95 K/UL — LOW (ref 1–3.3)
LYMPHOCYTES # BLD AUTO: 12.5 % — LOW (ref 13–44)
MCHC RBC-ENTMCNC: 30.9 PG — SIGNIFICANT CHANGE UP (ref 27–34)
MCHC RBC-ENTMCNC: 31.1 PG — SIGNIFICANT CHANGE UP (ref 27–34)
MCHC RBC-ENTMCNC: 32.4 GM/DL — SIGNIFICANT CHANGE UP (ref 32–36)
MCHC RBC-ENTMCNC: 32.7 GM/DL — SIGNIFICANT CHANGE UP (ref 32–36)
MCV RBC AUTO: 95.1 FL — SIGNIFICANT CHANGE UP (ref 80–100)
MCV RBC AUTO: 95.2 FL — SIGNIFICANT CHANGE UP (ref 80–100)
MONOCYTES # BLD AUTO: 0.55 K/UL — SIGNIFICANT CHANGE UP (ref 0–0.9)
MONOCYTES NFR BLD AUTO: 7.2 % — SIGNIFICANT CHANGE UP (ref 2–14)
NEUTROPHILS # BLD AUTO: 5.67 K/UL — SIGNIFICANT CHANGE UP (ref 1.8–7.4)
NEUTROPHILS NFR BLD AUTO: 74.8 % — SIGNIFICANT CHANGE UP (ref 43–77)
NRBC # BLD: 0 /100 WBCS — SIGNIFICANT CHANGE UP (ref 0–0)
NRBC # BLD: 0 /100 WBCS — SIGNIFICANT CHANGE UP (ref 0–0)
PLATELET # BLD AUTO: 150 K/UL — SIGNIFICANT CHANGE UP (ref 150–400)
PLATELET # BLD AUTO: 179 K/UL — SIGNIFICANT CHANGE UP (ref 150–400)
POTASSIUM SERPL-MCNC: 3.8 MMOL/L — SIGNIFICANT CHANGE UP (ref 3.5–5.3)
POTASSIUM SERPL-MCNC: 4 MMOL/L — SIGNIFICANT CHANGE UP (ref 3.5–5.3)
POTASSIUM SERPL-SCNC: 3.8 MMOL/L — SIGNIFICANT CHANGE UP (ref 3.5–5.3)
POTASSIUM SERPL-SCNC: 4 MMOL/L — SIGNIFICANT CHANGE UP (ref 3.5–5.3)
PROT SERPL-MCNC: 6.5 G/DL — SIGNIFICANT CHANGE UP (ref 6–8.3)
PROTHROM AB SERPL-ACNC: 9.4 SEC — LOW (ref 9.5–13)
RBC # BLD: 3.5 M/UL — LOW (ref 3.8–5.2)
RBC # BLD: 3.53 M/UL — LOW (ref 3.8–5.2)
RBC # FLD: 13.7 % — SIGNIFICANT CHANGE UP (ref 10.3–14.5)
RBC # FLD: 14 % — SIGNIFICANT CHANGE UP (ref 10.3–14.5)
RH IG SCN BLD-IMP: POSITIVE — SIGNIFICANT CHANGE UP
SODIUM SERPL-SCNC: 138 MMOL/L — SIGNIFICANT CHANGE UP (ref 135–145)
SODIUM SERPL-SCNC: 141 MMOL/L — SIGNIFICANT CHANGE UP (ref 135–145)
WBC # BLD: 7.59 K/UL — SIGNIFICANT CHANGE UP (ref 3.8–10.5)
WBC # BLD: 9 K/UL — SIGNIFICANT CHANGE UP (ref 3.8–10.5)
WBC # FLD AUTO: 7.59 K/UL — SIGNIFICANT CHANGE UP (ref 3.8–10.5)
WBC # FLD AUTO: 9 K/UL — SIGNIFICANT CHANGE UP (ref 3.8–10.5)

## 2024-09-01 PROCEDURE — 73502 X-RAY EXAM HIP UNI 2-3 VIEWS: CPT | Mod: 26,LT

## 2024-09-01 PROCEDURE — 73560 X-RAY EXAM OF KNEE 1 OR 2: CPT | Mod: 26,LT

## 2024-09-01 PROCEDURE — 76377 3D RENDER W/INTRP POSTPROCES: CPT | Mod: 26

## 2024-09-01 PROCEDURE — 70450 CT HEAD/BRAIN W/O DYE: CPT | Mod: 26,MC

## 2024-09-01 PROCEDURE — 99223 1ST HOSP IP/OBS HIGH 75: CPT

## 2024-09-01 PROCEDURE — 27245 TREAT THIGH FRACTURE: CPT | Mod: LT

## 2024-09-01 PROCEDURE — 71045 X-RAY EXAM CHEST 1 VIEW: CPT | Mod: 26

## 2024-09-01 PROCEDURE — 73552 X-RAY EXAM OF FEMUR 2/>: CPT | Mod: 26,LT

## 2024-09-01 PROCEDURE — 72192 CT PELVIS W/O DYE: CPT | Mod: 26,MC

## 2024-09-01 DEVICE — SCREW LOKG 5X35MM STRL: Type: IMPLANTABLE DEVICE | Site: LEFT | Status: FUNCTIONAL

## 2024-09-01 DEVICE — SCREW LAG GAMMA 10.5X95MM STRL: Type: IMPLANTABLE DEVICE | Site: LEFT | Status: FUNCTIONAL

## 2024-09-01 DEVICE — K-WIRE STRYKER 3.2MM X 450MM: Type: IMPLANTABLE DEVICE | Site: LEFT | Status: FUNCTIONAL

## 2024-09-01 DEVICE — NAIL INTRAMED TROCHANTER 125 DEG 10X170MM: Type: IMPLANTABLE DEVICE | Site: LEFT | Status: FUNCTIONAL

## 2024-09-01 DEVICE — GUIDEWIRE BALL TIP 3MM X 1000MM FOR T2 R1.5 FEMORAL NAILING SYSTEM: Type: IMPLANTABLE DEVICE | Site: LEFT | Status: FUNCTIONAL

## 2024-09-01 DEVICE — PIN ORTHO PRECISION TAPER 3.9X450MM: Type: IMPLANTABLE DEVICE | Site: LEFT | Status: FUNCTIONAL

## 2024-09-01 RX ORDER — ONDANSETRON 2 MG/ML
4 INJECTION, SOLUTION INTRAMUSCULAR; INTRAVENOUS ONCE
Refills: 0 | Status: DISCONTINUED | OUTPATIENT
Start: 2024-09-01 | End: 2024-09-01

## 2024-09-01 RX ORDER — POLYETHYLENE GLYCOL 3350 17 G/17G
17 POWDER, FOR SOLUTION ORAL DAILY
Refills: 0 | Status: DISCONTINUED | OUTPATIENT
Start: 2024-09-01 | End: 2024-09-04

## 2024-09-01 RX ORDER — OXYCODONE HYDROCHLORIDE 5 MG/1
5 TABLET ORAL EVERY 4 HOURS
Refills: 0 | Status: DISCONTINUED | OUTPATIENT
Start: 2024-09-01 | End: 2024-09-01

## 2024-09-01 RX ORDER — PANTOPRAZOLE SODIUM 40 MG
40 TABLET, DELAYED RELEASE (ENTERIC COATED) ORAL
Refills: 0 | Status: DISCONTINUED | OUTPATIENT
Start: 2024-09-01 | End: 2024-09-04

## 2024-09-01 RX ORDER — ACETAMINOPHEN 325 MG/1
975 TABLET ORAL EVERY 8 HOURS
Refills: 0 | Status: DISCONTINUED | OUTPATIENT
Start: 2024-09-01 | End: 2024-09-04

## 2024-09-01 RX ORDER — ENOXAPARIN SODIUM 100 MG/ML
40 INJECTION SUBCUTANEOUS EVERY 24 HOURS
Refills: 0 | Status: DISCONTINUED | OUTPATIENT
Start: 2024-09-01 | End: 2024-09-04

## 2024-09-01 RX ORDER — SODIUM CHLORIDE 9 MG/ML
1000 INJECTION INTRAMUSCULAR; INTRAVENOUS; SUBCUTANEOUS
Refills: 0 | Status: DISCONTINUED | OUTPATIENT
Start: 2024-09-01 | End: 2024-09-01

## 2024-09-01 RX ORDER — OXYCODONE HYDROCHLORIDE 5 MG/1
5 TABLET ORAL ONCE
Refills: 0 | Status: DISCONTINUED | OUTPATIENT
Start: 2024-09-01 | End: 2024-09-01

## 2024-09-01 RX ORDER — OXYCODONE HYDROCHLORIDE 5 MG/1
2.5 TABLET ORAL EVERY 4 HOURS
Refills: 0 | Status: DISCONTINUED | OUTPATIENT
Start: 2024-09-01 | End: 2024-09-01

## 2024-09-01 RX ORDER — OXYCODONE HYDROCHLORIDE 5 MG/1
5 TABLET ORAL EVERY 4 HOURS
Refills: 0 | Status: DISCONTINUED | OUTPATIENT
Start: 2024-09-01 | End: 2024-09-04

## 2024-09-01 RX ORDER — FENTANYL CITRATE 50 UG/ML
25 INJECTION INTRAMUSCULAR; INTRAVENOUS
Refills: 0 | Status: DISCONTINUED | OUTPATIENT
Start: 2024-09-01 | End: 2024-09-01

## 2024-09-01 RX ORDER — FENTANYL CITRATE 50 UG/ML
50 INJECTION INTRAMUSCULAR; INTRAVENOUS
Refills: 0 | Status: DISCONTINUED | OUTPATIENT
Start: 2024-09-01 | End: 2024-09-01

## 2024-09-01 RX ORDER — ACETAMINOPHEN 325 MG/1
1000 TABLET ORAL ONCE
Refills: 0 | Status: DISCONTINUED | OUTPATIENT
Start: 2024-09-01 | End: 2024-09-04

## 2024-09-01 RX ORDER — CEFAZOLIN SODIUM 2 G/100ML
2000 INJECTION, SOLUTION INTRAVENOUS EVERY 8 HOURS
Refills: 0 | Status: COMPLETED | OUTPATIENT
Start: 2024-09-01 | End: 2024-09-01

## 2024-09-01 RX ORDER — OXYCODONE HYDROCHLORIDE 5 MG/1
2.5 TABLET ORAL EVERY 4 HOURS
Refills: 0 | Status: DISCONTINUED | OUTPATIENT
Start: 2024-09-01 | End: 2024-09-04

## 2024-09-01 RX ORDER — SENNA 187 MG
2 TABLET ORAL AT BEDTIME
Refills: 0 | Status: DISCONTINUED | OUTPATIENT
Start: 2024-09-01 | End: 2024-09-04

## 2024-09-01 RX ORDER — SODIUM CHLORIDE 9 MG/ML
1000 INJECTION INTRAMUSCULAR; INTRAVENOUS; SUBCUTANEOUS
Refills: 0 | Status: DISCONTINUED | OUTPATIENT
Start: 2024-09-01 | End: 2024-09-04

## 2024-09-01 RX ORDER — ONDANSETRON 2 MG/ML
4 INJECTION, SOLUTION INTRAMUSCULAR; INTRAVENOUS EVERY 4 HOURS
Refills: 0 | Status: DISCONTINUED | OUTPATIENT
Start: 2024-09-01 | End: 2024-09-01

## 2024-09-01 RX ADMIN — POLYETHYLENE GLYCOL 3350 17 GRAM(S): 17 POWDER, FOR SOLUTION ORAL at 17:49

## 2024-09-01 RX ADMIN — Medication 2 MILLIGRAM(S): at 00:34

## 2024-09-01 RX ADMIN — ACETAMINOPHEN 975 MILLIGRAM(S): 325 TABLET ORAL at 15:35

## 2024-09-01 RX ADMIN — ENOXAPARIN SODIUM 40 MILLIGRAM(S): 100 INJECTION SUBCUTANEOUS at 21:56

## 2024-09-01 RX ADMIN — Medication 2 TABLET(S): at 21:57

## 2024-09-01 RX ADMIN — Medication 2 MILLIGRAM(S): at 01:00

## 2024-09-01 RX ADMIN — ACETAMINOPHEN 975 MILLIGRAM(S): 325 TABLET ORAL at 23:13

## 2024-09-01 RX ADMIN — CEFAZOLIN SODIUM 100 MILLIGRAM(S): 2 INJECTION, SOLUTION INTRAVENOUS at 15:35

## 2024-09-01 RX ADMIN — CEFAZOLIN SODIUM 100 MILLIGRAM(S): 2 INJECTION, SOLUTION INTRAVENOUS at 23:50

## 2024-09-01 NOTE — ED PROVIDER NOTE - CLINICAL SUMMARY MEDICAL DECISION MAKING FREE TEXT BOX
87 yo f no known medical history p/w unwitnessed fall. pt A&OX4, says she tripped over her shoes and fell onto her back. reports pain in her left leg and has difficulty internally rotating it due to pain. did not hit head, no loc. pt not on ac. denies headache, chest pain, sob, abdominal pain, pain in arms or right leg. on arrival pt vitals within normal limits, neurologically intact A&OX4, no neurologic deficits, no chest wall tenderness or rib tenderness, pain to left lower extremity mainly over femur and leg is externally rotated, distally motor/sensory and neurovascularly intact. xr to eval for femur fx/dislocation, ct head given unwitnessed fall and pt age r/o intracranial bleed.

## 2024-09-01 NOTE — ED PROVIDER NOTE - ATTENDING CONTRIBUTION TO CARE
I have personally performed a face to face medical and diagnostic evaluation of the patient. I have discussed with and reviewed the Resident's and/or ACP's and/or Medical/PA/NP student's note and agree with the History, ROS, Physical Exam and MDM unless otherwise indicated. A brief summary of my personal evaluation and impression can be found below.     88 female prior history of right-sided hip fracture status post repair and status post rehab, ANO x 4 presenting to the emergency department after mechanical fall, patient states she tripped over her shoes and fell onto her back now reporting left leg pain, no paresthesias or weakness. L Leg externally rotated and shortened.   Denies head trauma, LOC, blood thinners.  No headache, blurred vision, dizziness.  Denies chest pain, rib pain, trouble breathing.  No nausea, vomiting.    GENERAL: Awake. Alert. NAD. Well nourished.  HEENT: NC/AT, PERRL, EOMI, Conjunctiva pink, no scleral icterus. Airway patent.   LUNGS: CTAB. No wheezes or rales noted.  CARDIAC: RRR.  ABDOMEN: Soft, NT, ND, no rebound, no guarding.  BACK: No midline spinal tenderness  EXT: No edema, no calf tenderness, distal pulses 2+ bilaterally, L LE ext rot  NEURO: A&Ox3. Moving all extremities. Sensation and strength intact throughout.  SKIN: Warm and dry.  PSYCH: Normal affect.     Given hx and physical, ddx includes but is not limited to fracture, ICH.   Low concern for syncope or cardiogenic origin of fall (patient states she tripped over her shoes, patient is good historian remembers details of fall, no precipitating sx).  Patient did not have prolonged downtime.  Low concern for rhabdomyolysis.  Plan for x-ray, labs, CT, reassess.

## 2024-09-01 NOTE — CONSULT NOTE ADULT - SUBJECTIVE AND OBJECTIVE BOX
HPI:  88 year old female no PMH, prior R hip IMN (6/2023) presents with L hip pain s/p mechanical fall yesterday, Patient denies head trauma, prodrome of chest pain, lightheadedness, palpitation or LOC. Patient denies numbness/tingling in the LLE or any other injuries.  Patient lives alone with her dog on the first floor of a two-family house with 5 steps into the house.  Since last hip surgery, patient has been using a walker to ambulate inside and outside her house.  She walks her dog daily (>2 blocks) without symptoms of chest pain, SYED or syncope.  She does her own cooking, cleaning and ADL's, but her niece help her with groceries.  Patient states she tolerated R hip IMN well last year, denies any adverse reaction to anesthesia or bleeding disorder.    Review of Systems:   CONSTITUTIONAL: No feverEYES: No eye pain, visual disturbances, or discharge  ENMT:  Has hard of hearing  NECK: No pain or stiffness  RESPIRATORY: No cough, wheezing, No shortness of breath  CARDIOVASCULAR: No chest pain, palpitations, dizziness, or leg swelling  GASTROINTESTINAL: No abdominal or epigastric pain. No nausea, vomiting  GENITOURINARY: No dysuria  NEUROLOGICAL: No headaches, no numbness or tingling of LLE  MUSCULOSKELETAL: has L hip pain  HEME/LYMPH: No easy bruising, or bleeding gums  ALLERY AND IMMUNOLOGIC: No hives or eczema      PMH:  Glaucoma      PSH:  R hip IMN (6/2023)    FHx;  Lung cancer - uncle who was a smoker    SocHx:  divorce, no children, retired , lives alone  3ppd smoker quit over 10 years ago, social alcohol, no history of withdraw    Alleries:  No Known Drug Allergies    Home Medications:  - multiple eye gtts (unsure of the names)    CURRENT MEDICATIONS  (STANDING):  acetaminophen     Tablet .. 975 milliGRAM(s) Oral every 8 hours  acetaminophen   IVPB .. 1000 milliGRAM(s) IV Intermittent once  pantoprazole    Tablet 40 milliGRAM(s) Oral before breakfast  senna 2 Tablet(s) Oral at bedtime  sodium chloride 0.9%. 1000 milliLiter(s) (100 mL/Hr) IV Continuous <Continuous>    MEDICATIONS  (PRN):  magnesium hydroxide Suspension 30 milliLiter(s) Oral daily PRN Constipation  melatonin 3 milliGRAM(s) Oral at bedtime PRN Insomnia  oxyCODONE    IR 5 milliGRAM(s) Oral every 4 hours PRN Severe Pain (7 - 10)  oxyCODONE    IR 2.5 milliGRAM(s) Oral every 4 hours PRN Moderate Pain (4 - 6)    Physical Exam:    T(C): 37 (09-01-24 @ 03:03)  HR: 77 (09-01-24 @ 03:03)  BP: 122/67 (09-01-24 @ 03:03)  RR: 17 (09-01-24 @ 03:03)  SpO2: 99% (09-01-24 @ 03:03)      PE  Gen: NAD, elderly, awake and alert  Head:  Atraumatic  Eyes: mild sclera injection b/l, EOMI  Neck: supple, no JVD  Resp: Unlabored breathing, CTA  LLE: Skin intact, no ecchymosis, DP+, soft compartments,+log roll.             PMD:  Jake Ag    HPI:  88 year old female no PMH, prior R hip IMN (6/2023) presents with L hip pain s/p mechanical fall yesterday, Patient denies head trauma, prodrome of chest pain, lightheadedness, palpitation or LOC. Patient denies numbness/tingling in the LLE or any other injuries.  Patient lives alone with her dog on the first floor of a two-family house with 5 steps into the house.  Since last hip surgery, patient has been using a walker to ambulate inside and outside her house.  She walks her dog daily (>2 blocks) without symptoms of chest pain, SYED or syncope.  She does her own cooking, cleaning and ADL's, but her niece help her with groceries.  Patient states she tolerated R hip IMN well last year, denies any adverse reaction to anesthesia or bleeding disorder.    Review of Systems:   CONSTITUTIONAL: No feverEYES: No eye pain, visual disturbances, or discharge  ENMT:  Has hard of hearing  NECK: No pain or stiffness  RESPIRATORY: No cough, wheezing, No shortness of breath  CARDIOVASCULAR: No chest pain, palpitations, dizziness, or leg swelling  GASTROINTESTINAL: No abdominal or epigastric pain. No nausea, vomiting  GENITOURINARY: No dysuria  NEUROLOGICAL: No headaches, no numbness or tingling of LLE  MUSCULOSKELETAL: has L hip pain  HEME/LYMPH: No easy bruising, or bleeding gums  ALLERY AND IMMUNOLOGIC: No hives or eczema      PMH:  Glaucoma      PSH:  R hip IMN (6/2023)    FHx;  Lung cancer - uncle who was a smoker    SocHx:  , no children, retired , lives alone  3ppd smoker quit over 10 years ago, social alcohol, no history of withdraw  HCP: niece Tierra Hyde    Alleries:  No Known Drug Allergies    Home Medications:  - multiple eye gtts (unsure of the names)    CURRENT MEDICATIONS  (STANDING):  acetaminophen     Tablet .. 975 milliGRAM(s) Oral every 8 hours  acetaminophen   IVPB .. 1000 milliGRAM(s) IV Intermittent once  pantoprazole    Tablet 40 milliGRAM(s) Oral before breakfast  senna 2 Tablet(s) Oral at bedtime  sodium chloride 0.9%. 1000 milliLiter(s) (100 mL/Hr) IV Continuous <Continuous>    MEDICATIONS  (PRN):  magnesium hydroxide Suspension 30 milliLiter(s) Oral daily PRN Constipation  melatonin 3 milliGRAM(s) Oral at bedtime PRN Insomnia  oxyCODONE    IR 5 milliGRAM(s) Oral every 4 hours PRN Severe Pain (7 - 10)  oxyCODONE    IR 2.5 milliGRAM(s) Oral every 4 hours PRN Moderate Pain (4 - 6)    Physical Exam:    T(C): 37 (09-01-24 @ 03:03)  HR: 77 (09-01-24 @ 03:03)  BP: 122/67 (09-01-24 @ 03:03)  RR: 17 (09-01-24 @ 03:03)  SpO2: 99% (09-01-24 @ 03:03)      PE  Gen: NAD, elderly, awake and alert  NEUROLOGY: AAOx3  Head:  Atraumatic  Eyes: mild sclera injection b/l, EOMI  Neck: supple, no JVD  Chest: Unlabored breathing, CTA  BACK: No spinal tenderness  PSYCH: Nl behavior, nl affect  LLE: Skin intact, no ecchymosis, DP+, soft compartments,+log roll.  SKIN: Normal color    Labs, imaging reports and EKG tracing personally reviewed and interpreted                       10.9   7.59  )-----------( 179      ( 01 Sep 2024 00:49 )             33.6       09-01    141  |  104  |  28<H>  ----------------------------<  112<H>  3.8   |  26  |  0.90    Ca    9.2      01 Sep 2024 00:49    TPro  6.5  /  Alb  3.8  /  TBili  0.2  /  DBili  x   /  AST  21  /  ALT  17  /  AlkPhos  82  09-01            LIVER FUNCTIONS - ( 01 Sep 2024 00:49 )  Alb: 3.8 g/dL / Pro: 6.5 g/dL / ALK PHOS: 82 U/L / ALT: 17 U/L / AST: 21 U/L / GGT: x             PT/INR - ( 01 Sep 2024 00:49 )   PT: 9.4 sec;   INR: 0.85 ratio         PTT - ( 01 Sep 2024 00:49 )  PTT:29.3 sec    Urinalysis Basic - ( 01 Sep 2024 00:49 )    Color: x / Appearance: x / SG: x / pH: x  Gluc: 112 mg/dL / Ketone: x  / Bili: x / Urobili: x   Blood: x / Protein: x / Nitrite: x   Leuk Esterase: x / RBC: x / WBC x   Sq Epi: x / Non Sq Epi: x / Bacteria: x

## 2024-09-01 NOTE — ED ADULT NURSE REASSESSMENT NOTE - NS ED NURSE REASSESS COMMENT FT1
1 bag secured and labeled with pts belongings   placed in OR cabinet in ED in purple unable to assess

## 2024-09-01 NOTE — H&P ADULT - ATTENDING COMMENTS
Associated images, notes, and labs were reviewed. The patient was seen and examined. Risks and benefits of operative versus nonoperative management were discussed with the patient. The patient showed a good understanding of the injury and the treatment options. They would like to move forward with operative management of the hip fracture.

## 2024-09-01 NOTE — ED ADULT NURSE NOTE - OBJECTIVE STATEMENT
87 yo female PMH right hip sx, A&ox3, BIBEMS s/p fall.  PT reports around 1030pm tripped over her shoe coming into the house, c/o left hip pain.  Denies head injury/LOC.  Breathing even and unlabored, abdomen soft nontender, left hip outward rotation and shortened, pulses, motor, sensory intact. Pt denies chest pain, palpitations, shortness of breath, headache, visual disturbances, numbness/tingling, fever, chills, diaphoresis,  nausea, vomiting, constipation, diarrhea, or urinary symptoms.  20G Iv LAC, VSS.  side rails up, bed in lowest position, stretcher locked.

## 2024-09-01 NOTE — ED PROVIDER NOTE - NS ED ROS FT
+L hip/ leg pain  denies headache, dizziness, chest pain, sob, pain in arms, sensory change to arm or leg, back pain

## 2024-09-01 NOTE — ED PROVIDER NOTE - PHYSICAL EXAMINATION
GENERAL: well appearing in no acute distress, non-toxic appearing  HEAD: normocephalic, atraumatic  CARDIAC: regular rate and rhythm, normal S1S2, no appreciable murmurs, 2+ pulses in UE/LE b/l  PULM: normal breath sounds, clear to ascultation bilaterally, no rales, rhonchi, wheezing  GI: abdomen nondistended, soft, nontender, no guarding, rebound tenderness  NEURO: Moves all extremities spontaneously. symmetric  strength b/l UE, elbow flexion 5/5 bilaterally, elbow extension 5/5 bilaterally, pain to left femur, leg externally rotated, motor intact distally , symmetric smile, EOM intact b/l  MSK: no midline tenderness, no chest wall tenderness

## 2024-09-01 NOTE — CONSULT NOTE ADULT - PROBLEM SELECTOR PROBLEM 1
Patient is a 64y old  Female who presents with a chief complaint of Left Total Knee Replacement Arthroplasty (24 May 2023 16:14)    HPI:  This is a 63yo Female with PMH of HTN, HLD, DM and OA who presents to [facility] in 5/22 for orthopedic surgery. Patient s/p left total knee arthroplasty with Dr. Glover on 5/22/2023. Patient tolerated the procedure well without any intraoperative complications. Patient tolerated physical therapy well, and the pain was controlled. Patient is weight bearing as tolerated with cane/walker as needed. Seen by medical attending for continuity of care and management and cleared for safe discharge. Any suture/staples to be removed on post-op day #14. Patient is on 325mg of ASA for DVT prophylaxis for total of 6 weeks.    Patient was evaluated by PM&R and therapy for functional deficits, gait/ADL impairments and acute rehabilitation was recommended. Patient was medically optimized for discharge to [facility] IRU on 5/24/23.        (24 May 2023 16:14)    PAST MEDICAL & SURGICAL HISTORY:  HTN (hypertension)      HLD (hyperlipidemia)      Type 2 diabetes mellitus      Obesity      History of cholecystectomy      Status post total abdominal hysterectomy and bilateral salpingo-oophorectomy (REGGIE-BSO)      H/O cataract extraction      Status post excision of lipoma        SOCIAL HISTORY:  FAMILY HISTORY:    ALLERGIES:  No Known Allergies    MEDICATIONS  (STANDING):  aspirin 325 milliGRAM(s) Oral two times a day  atorvastatin 10 milliGRAM(s) Oral at bedtime  celecoxib 200 milliGRAM(s) Oral two times a day  dextrose 5%. 1000 milliLiter(s) (50 mL/Hr) IV Continuous <Continuous>  dextrose 5%. 1000 milliLiter(s) (100 mL/Hr) IV Continuous <Continuous>  dextrose 50% Injectable 25 Gram(s) IV Push once  dextrose 50% Injectable 12.5 Gram(s) IV Push once  dextrose 50% Injectable 25 Gram(s) IV Push once  folic acid 1 milliGRAM(s) Oral daily  glucagon  Injectable 1 milliGRAM(s) IntraMuscular once  hydrochlorothiazide 12.5 milliGRAM(s) Oral daily  insulin lispro (ADMELOG) corrective regimen sliding scale   SubCutaneous three times a day before meals  insulin lispro (ADMELOG) corrective regimen sliding scale   SubCutaneous at bedtime  losartan 50 milliGRAM(s) Oral daily  multivitamin 1 Tablet(s) Oral daily  pantoprazole    Tablet 40 milliGRAM(s) Oral before breakfast  polyethylene glycol 3350 17 Gram(s) Oral at bedtime    MEDICATIONS  (PRN):  dextrose Oral Gel 15 Gram(s) Oral once PRN Blood Glucose LESS THAN 70 milliGRAM(s)/deciliter  oxyCODONE    IR 5 milliGRAM(s) Oral every 4 hours PRN Moderate Pain (4 - 6)  oxyCODONE    IR 10 milliGRAM(s) Oral every 4 hours PRN Severe Pain (7 - 10)  traMADol 50 milliGRAM(s) Oral every 6 hours PRN Mild Pain (1 - 3)    Review of Systems: Refer to HPI for pertinent positives and negatives. All other ROS reviewed and negative except as otherwise stated above.    Vital Signs Last 24 Hrs  T(F): 99.1 (24 May 2023 20:35), Max: 99.5 (24 May 2023 18:44)  HR: 69 (25 May 2023 05:48) (69 - 80)  BP: 146/75 (25 May 2023 05:48) (125/55 - 150/83)  RR: 18 (24 May 2023 20:35) (17 - 18)  SpO2: 97% (24 May 2023 20:35) (96% - 100%)  I&O's Summary    PHYSICAL EXAM:  GENERAL: NAD, well-groomed, well-developed  HEAD:  Atraumatic, Normocephalic  EYES: EOMI, PERRL, conjunctiva and sclera clear  ENMT: Moist mucous membranes, Good dentition  NECK: Supple, No JVD  CHEST/LUNG: Clear to auscultation bilaterally, non-labored breathing, good air entry  HEART: RRR; S1/S2, No murmur  ABDOMEN: Soft, Nontender, Nondistended; Bowel sounds present  VASCULAR: Normal pulses, Normal capillary refill  EXTREMITIES: No cyanosis, No edema  LYMPH: No lymphadenopathy noted  SKIN: Warm, Intact  PSYCH: Normal mood and affect  NERVOUS SYSTEM:  A/O x3, Good concentration; CN 2-12 intact, No focal deficits, moves all extremities    LABS:                        8.8    8.79  )-----------( 236      ( 25 May 2023 05:35 )             26.5     05-25    138  |  101  |  10  ----------------------------<  107  3.6   |  30  |  0.79    Ca    8.3      25 May 2023 05:35    TPro  5.9  /  Alb  2.3  /  TBili  0.8  /  DBili  x   /  AST  13  /  ALT  15  /  AlkPhos  56  05-25    POCT Blood Glucose.: 117 mg/dL (25 May 2023 07:45)  POCT Blood Glucose.: 159 mg/dL (24 May 2023 22:06)  POCT Blood Glucose.: 183 mg/dL (24 May 2023 16:41)  POCT Blood Glucose.: 143 mg/dL (24 May 2023 11:21)    COVID-19 PCR: NotDete (05-23-23 @ 12:32)    RADIOLOGY & ADDITIONAL TESTS:    Care Discussed with Consultants/Other Providers: Patient is a 64y old  Female who presents with a chief complaint of Left Total Knee Replacement Arthroplasty (24 May 2023 16:14)    HPI:  This is a 65yo Female with PMH of HTN, HLD, DM and OA who presents to [facility] in 5/22 for orthopedic surgery. Patient s/p left total knee arthroplasty with Dr. Glover on 5/22/2023. Patient tolerated the procedure well without any intraoperative complications. Patient tolerated physical therapy well, and the pain was controlled. Patient is weight bearing as tolerated with cane/walker as needed. Seen by medical attending for continuity of care and management and cleared for safe discharge. Any suture/staples to be removed on post-op day #14. Patient is on 325mg of ASA for DVT prophylaxis for total of 6 weeks.    Patient was evaluated by PM&R and therapy for functional deficits, gait/ADL impairments and acute rehabilitation was recommended. Patient was medically optimized for discharge to [facility] IRU on 5/24/23.        (24 May 2023 16:14)    spoke to patient with her daughter who acted as a Ambreen , Jasmine. she reports constipation and not having a BM in 3 days.   She denies f/c/n/v/d. finds the room cold. no chest pain or palpitations. No sob. knee pain currently 6 out of 10.     PAST MEDICAL & SURGICAL HISTORY:  HTN (hypertension)      HLD (hyperlipidemia)      Type 2 diabetes mellitus      Obesity      History of cholecystectomy      Status post total abdominal hysterectomy and bilateral salpingo-oophorectomy (REGGIE-BSO)      H/O cataract extraction      Status post excision of lipoma        SOCIAL HISTORY: pt does not smoke, drink, or use drugs  FAMILY HISTORY: mom w/ DM2, HTN, HLD, ESRD, and breast ca    ALLERGIES:  No Known Allergies    MEDICATIONS  (STANDING):  aspirin 325 milliGRAM(s) Oral two times a day  atorvastatin 10 milliGRAM(s) Oral at bedtime  celecoxib 200 milliGRAM(s) Oral two times a day  dextrose 5%. 1000 milliLiter(s) (50 mL/Hr) IV Continuous <Continuous>  dextrose 5%. 1000 milliLiter(s) (100 mL/Hr) IV Continuous <Continuous>  dextrose 50% Injectable 25 Gram(s) IV Push once  dextrose 50% Injectable 12.5 Gram(s) IV Push once  dextrose 50% Injectable 25 Gram(s) IV Push once  folic acid 1 milliGRAM(s) Oral daily  glucagon  Injectable 1 milliGRAM(s) IntraMuscular once  hydrochlorothiazide 12.5 milliGRAM(s) Oral daily  insulin lispro (ADMELOG) corrective regimen sliding scale   SubCutaneous three times a day before meals  insulin lispro (ADMELOG) corrective regimen sliding scale   SubCutaneous at bedtime  losartan 50 milliGRAM(s) Oral daily  multivitamin 1 Tablet(s) Oral daily  pantoprazole    Tablet 40 milliGRAM(s) Oral before breakfast  polyethylene glycol 3350 17 Gram(s) Oral at bedtime    MEDICATIONS  (PRN):  dextrose Oral Gel 15 Gram(s) Oral once PRN Blood Glucose LESS THAN 70 milliGRAM(s)/deciliter  oxyCODONE    IR 5 milliGRAM(s) Oral every 4 hours PRN Moderate Pain (4 - 6)  oxyCODONE    IR 10 milliGRAM(s) Oral every 4 hours PRN Severe Pain (7 - 10)  traMADol 50 milliGRAM(s) Oral every 6 hours PRN Mild Pain (1 - 3)    Review of Systems: Refer to HPI for pertinent positives and negatives. All other ROS reviewed and negative except as otherwise stated above.    Vital Signs Last 24 Hrs  T(F): 99.1 (24 May 2023 20:35), Max: 99.5 (24 May 2023 18:44)  HR: 69 (25 May 2023 05:48) (69 - 80)  BP: 146/75 (25 May 2023 05:48) (125/55 - 150/83)  RR: 18 (24 May 2023 20:35) (17 - 18)  SpO2: 97% (24 May 2023 20:35) (96% - 100%)  I&O's Summary    PHYSICAL EXAM:  GENERAL: NAD, well-groomed, well-developed  HEAD:  Atraumatic, Normocephalic  EYES: EOMI, PERRL, conjunctiva and sclera clear  ENMT: Moist mucous membranes, Good dentition  NECK: Supple, No JVD  CHEST/LUNG: Clear to auscultation bilaterally, non-labored breathing, good air entry  HEART: RRR; S1/S2, No murmur  ABDOMEN: Soft, Nontender, Nondistended; Bowel sounds present  VASCULAR: Normal pulses, Normal capillary refill  EXTREMITIES: No cyanosis, No edema  LYMPH: No lymphadenopathy noted  SKIN: Warm, Intact  PSYCH: Normal mood and affect  NERVOUS SYSTEM:  A/O x3, Good concentration; CN 2-12 intact, No focal deficits, moves all extremities, walks slowly    LABS:                        8.8    8.79  )-----------( 236      ( 25 May 2023 05:35 )             26.5     05-25    138  |  101  |  10  ----------------------------<  107  3.6   |  30  |  0.79    Ca    8.3      25 May 2023 05:35    TPro  5.9  /  Alb  2.3  /  TBili  0.8  /  DBili  x   /  AST  13  /  ALT  15  /  AlkPhos  56  05-25    POCT Blood Glucose.: 117 mg/dL (25 May 2023 07:45)  POCT Blood Glucose.: 159 mg/dL (24 May 2023 22:06)  POCT Blood Glucose.: 183 mg/dL (24 May 2023 16:41)  POCT Blood Glucose.: 143 mg/dL (24 May 2023 11:21)    COVID-19 PCR: Juan Josetepritesh (05-23-23 @ 12:32)    RADIOLOGY & ADDITIONAL TESTS:    Care Discussed with Consultants/Other Providers: Patient is a 64y old  Female who presents with a chief complaint of Left Total Knee Replacement Arthroplasty (24 May 2023 16:14)    HPI:  This is a 65yo Female with PMH of HTN, HLD, DM and OA who presents to [facility] in 5/22 for orthopedic surgery. Patient s/p left total knee arthroplasty with Dr. Glover on 5/22/2023. Patient tolerated the procedure well without any intraoperative complications. Patient tolerated physical therapy well, and the pain was controlled. Patient is weight bearing as tolerated with cane/walker as needed. Seen by medical attending for continuity of care and management and cleared for safe discharge. Any suture/staples to be removed on post-op day #14. Patient is on 325mg of ASA for DVT prophylaxis for total of 6 weeks.    Patient was evaluated by PM&R and therapy for functional deficits, gait/ADL impairments and acute rehabilitation was recommended. Patient was medically optimized for discharge to [facility] IRU on 5/24/23.        (24 May 2023 16:14)    spoke to patient with her daughter who acted as a Ambreen , Jasmine. she reports constipation and not having a BM in 3 days.   She denies f/c/n/v/d. finds the room cold. no chest pain or palpitations. No sob. knee pain currently 6 out of 10.     PAST MEDICAL & SURGICAL HISTORY:  HTN (hypertension)      HLD (hyperlipidemia)      Type 2 diabetes mellitus      Obesity      History of cholecystectomy      Status post total abdominal hysterectomy and bilateral salpingo-oophorectomy (REGGIE-BSO)      H/O cataract extraction      Status post excision of lipoma        SOCIAL HISTORY: pt does not smoke, drink, or use drugs  FAMILY HISTORY: mom w/ DM2, HTN, HLD, ESRD, and breast ca    ALLERGIES:  No Known Allergies    MEDICATIONS  (STANDING):  aspirin 325 milliGRAM(s) Oral two times a day  atorvastatin 10 milliGRAM(s) Oral at bedtime  celecoxib 200 milliGRAM(s) Oral two times a day  dextrose 5%. 1000 milliLiter(s) (50 mL/Hr) IV Continuous <Continuous>  dextrose 5%. 1000 milliLiter(s) (100 mL/Hr) IV Continuous <Continuous>  dextrose 50% Injectable 25 Gram(s) IV Push once  dextrose 50% Injectable 12.5 Gram(s) IV Push once  dextrose 50% Injectable 25 Gram(s) IV Push once  folic acid 1 milliGRAM(s) Oral daily  glucagon  Injectable 1 milliGRAM(s) IntraMuscular once  hydrochlorothiazide 12.5 milliGRAM(s) Oral daily  insulin lispro (ADMELOG) corrective regimen sliding scale   SubCutaneous three times a day before meals  insulin lispro (ADMELOG) corrective regimen sliding scale   SubCutaneous at bedtime  losartan 50 milliGRAM(s) Oral daily  multivitamin 1 Tablet(s) Oral daily  pantoprazole    Tablet 40 milliGRAM(s) Oral before breakfast  polyethylene glycol 3350 17 Gram(s) Oral at bedtime    MEDICATIONS  (PRN):  dextrose Oral Gel 15 Gram(s) Oral once PRN Blood Glucose LESS THAN 70 milliGRAM(s)/deciliter  oxyCODONE    IR 5 milliGRAM(s) Oral every 4 hours PRN Moderate Pain (4 - 6)  oxyCODONE    IR 10 milliGRAM(s) Oral every 4 hours PRN Severe Pain (7 - 10)  traMADol 50 milliGRAM(s) Oral every 6 hours PRN Mild Pain (1 - 3)    Review of Systems: Refer to HPI for pertinent positives and negatives. All other ROS reviewed and negative except as otherwise stated above.    Vital Signs Last 24 Hrs  T(F): 99.1 (24 May 2023 20:35), Max: 99.5 (24 May 2023 18:44)  HR: 69 (25 May 2023 05:48) (69 - 80)  BP: 146/75 (25 May 2023 05:48) (125/55 - 150/83)  RR: 18 (24 May 2023 20:35) (17 - 18)  SpO2: 97% (24 May 2023 20:35) (96% - 100%)  I&O's Summary    PHYSICAL EXAM:  GENERAL: NAD, well-groomed, well-developed  HEAD:  Atraumatic, Normocephalic  EYES: EOMI, PERRL, conjunctiva and sclera clear  ENMT: Moist mucous membranes, Good dentition  NECK: Supple, No JVD  CHEST/LUNG: Clear to auscultation bilaterally, non-labored breathing, good air entry  HEART: RRR; S1/S2, No murmur  ABDOMEN: Soft, Nontender, Nondistended; Bowel sounds present  VASCULAR: Normal pulses, Normal capillary refill  EXTREMITIES: No cyanosis, No edema  LYMPH: No lymphadenopathy noted  SKIN: Warm, Intact  PSYCH: Normal mood and affect  NERVOUS SYSTEM:  A/O x3, Good concentration; CN 2-12 intact, No focal deficits, moves all extremities, walks slowly    LABS:                        8.8    8.79  )-----------( 236      ( 25 May 2023 05:35 )             26.5     05-25    138  |  101  |  10  ----------------------------<  107  3.6   |  30  |  0.79    Ca    8.3      25 May 2023 05:35    TPro  5.9  /  Alb  2.3  /  TBili  0.8  /  DBili  x   /  AST  13  /  ALT  15  /  AlkPhos  56  05-25    POCT Blood Glucose.: 117 mg/dL (25 May 2023 07:45)  POCT Blood Glucose.: 159 mg/dL (24 May 2023 22:06)  POCT Blood Glucose.: 183 mg/dL (24 May 2023 16:41)  POCT Blood Glucose.: 143 mg/dL (24 May 2023 11:21)    COVID-19 PCR: NotDetec (05-23-23 @ 12:32)    RADIOLOGY & ADDITIONAL TESTS:    < from: Xray Knee 1 or 2 Views, Left (05.22.23 @ 12:01) >  IMPRESSION:  Unconstrained left total knee prosthesis implanted.    Intact and aligned hardware and no periprosthetic fractures.    Postoperative soft tissue changes.    Correlate with intraoperative findings.    < end of copied text >      Care Discussed with Consultants/Other Providers: Pre-operative examination for internal medicine

## 2024-09-01 NOTE — H&P ADULT - HISTORY OF PRESENT ILLNESS
88yFemale no PMH c/o L hip pain s/p mechanical fall yesterday, Patient denies head hit or LOC. Patient denies numbness or tingling in the LLE. Patient denies any other injuries. Of note she had a mechanical fall last year and suffered a similar fracture that was treated with R hip IMN by Dr. Ricardo.     ROS: 10 point review of systems otherwise negative unless noted in HPI    PMH:  Glaucoma      PSH:    AH:  No Known Drug Allergies    Meds: See med rec    T(C): 37 (09-01-24 @ 03:03)  HR: 77 (09-01-24 @ 03:03)  BP: 122/67 (09-01-24 @ 03:03)  RR: 17 (09-01-24 @ 03:03)  SpO2: 99% (09-01-24 @ 03:03)  Wt(kg): --                        10.9   7.59  )-----------( 179      ( 01 Sep 2024 00:49 )             33.6     09-01    141  |  104  |  28<H>  ----------------------------<  112<H>  3.8   |  26  |  0.90    Ca    9.2      01 Sep 2024 00:49    TPro  6.5  /  Alb  3.8  /  TBili  0.2  /  DBili  x   /  AST  21  /  ALT  17  /  AlkPhos  82  09-01    PT/INR - ( 01 Sep 2024 00:49 )   PT: 9.4 sec;   INR: 0.85 ratio         PTT - ( 01 Sep 2024 00:49 )  PTT:29.3 sec  Urinalysis Basic - ( 01 Sep 2024 00:49 )    Color: x / Appearance: x / SG: x / pH: x  Gluc: 112 mg/dL / Ketone: x  / Bili: x / Urobili: x   Blood: x / Protein: x / Nitrite: x   Leuk Esterase: x / RBC: x / WBC x   Sq Epi: x / Non Sq Epi: x / Bacteria: x        PE  Gen: NAD, alert and oriented  Resp: Unlabored breathing  LLE: Skin intact, no ecchymosis,        SILT DP/SP/ Chasity/Saph,        +EHL/FHL/TA/Gastroc,        DP+,        soft compartments, no calf ttp,        +log roll.      Secondary:  No TTP over bony landmarks, SILT BL, ROM intact BL, distal pulses palpable.    Imaging:  XR demonstrating L 2-part IT fx     Assessment/Plan:  88yFemale  with left IT fx     - plan for OR for operative fixation today   - NPO except medications  - IVF while NPO  - NWB LLE , bedrest  - Holding chemical DVT ppx, SCDs okay  - FU Preop labs  - Medical comanagement appreciated, please document clearance ASAP       [id number]

## 2024-09-01 NOTE — CONSULT NOTE ADULT - TIME BILLING
Health Maintenance Due   Topic Date Due   • Shingles Vaccine (1 of 2) 01/24/2006   • Lung Cancer Screening  01/24/2011       Patient is due for topics as listed above but is not proceeding with Immunization(s) Shingles at this time. Education provided for Immunization(s) Shingles.         reviewing prior documentation, reviewing available recent outpatient records, independently obtaining a history and interpreting results of tests, performing a physical examination, reviewing tests/imaging, discussing the plan with the patient, counseling and educating the patient, ordering medications/tests, documenting clinical information in the electronic health record, and coordinating care.

## 2024-09-01 NOTE — CHART NOTE - NSCHARTNOTEFT_GEN_A_CORE
Spoke to nocturnist, Dr. Angel Arriaga, who evaluated patient for medical optimization for OR today. Dr. Arriaga informed me patient is medically optimized for procedure and no further workup is required in order to proceed to OR. Dr. Arriaga will write consult note also documenting clearance.    For all questions related to patient care, please reach out via the on-call pager. Do NOT rely on Teams to contact orthopedic residents.*     Aaliyah Coleman, PGY-3  Orthopedic Surgery  [facility]: [id number]  [facility]: [id number]  [facility]: [id number]
Resting without complaints.  No Chest Pain, SOB, N/V.    T(C): 36.7 (09-01-24 @ 10:05), Max: 37.1 (09-01-24 @ 07:13)  HR: 77 (09-01-24 @ 11:15) (67 - 127)  BP: 136/63 (09-01-24 @ 11:15) (109/72 - 155/75)  RR: 14 (09-01-24 @ 11:15) (14 - 18)  SpO2: 96% (09-01-24 @ 11:15) (93% - 100%)  Wt(kg): --    Exam:  Alert and Mallie, No Acute Distress  Card: +S1/S2, RRR  Pulm: CTAB  Laterality: L Hip  Gauze and surgical film x3 c/d/i  Calves soft, non-tender bilaterally  +PF/DF/EHL/FHL  SILT  + DP pulse    Xray: Intra-op Fluoroscopy: S/P L Femur intertrochanteric Fx IM Nail ORIF.  Hardware in place and intact with no signs of new Fx.                          10.9   9.00  )-----------( 150      ( 01 Sep 2024 10:41 )             33.3    09-01    138  |  103  |  23  ----------------------------<  135<H>  4.0   |  22  |  0.77    Ca    8.7      01 Sep 2024 10:41    TPro  6.5  /  Alb  3.8  /  TBili  0.2  /  DBili  x   /  AST  21  /  ALT  17  /  AlkPhos  82  09-01      A/P: 88y Female S/p L Femur intertrochanteric Fx IM Nail ORIF. VSS. NAD  -PT/OT-WBAT LLE  -FU AM labs tomorrow  -IS  -DVT PPx: Lovenox and SCDs  -Pain Control  -Continue Current Tx  -Dispo planning PACU to Floor    Carlos Vaughan PA-C  Orthopedic Surgery Team  Team Pager #[phone]

## 2024-09-01 NOTE — PRE PROCEDURE NOTE - PRE PROCEDURE EVALUATION
Preop Dx: L IT fx   Surgeon: Karlo Ricardo  Procedure: L hip IMN     Vital Signs Last 24 Hrs  T(C): 37.1 (01 Sep 2024 07:13), Max: 37.1 (01 Sep 2024 07:13)  T(F): 98.8 (01 Sep 2024 07:13), Max: 98.8 (01 Sep 2024 07:13)  HR: 67 (01 Sep 2024 07:13) (67 - 77)  BP: 129/73 (01 Sep 2024 07:13) (109/72 - 151/72)  BP(mean): --  RR: 16 (01 Sep 2024 07:13) (16 - 18)  SpO2: 98% (01 Sep 2024 07:13) (98% - 100%)    Parameters below as of 01 Sep 2024 07:13  Patient On (Oxygen Delivery Method): room air                            10.9   7.59  )-----------( 179      ( 01 Sep 2024 00:49 )             33.6     09-01    141  |  104  |  28<H>  ----------------------------<  112<H>  3.8   |  26  |  0.90    Ca    9.2      01 Sep 2024 00:49    TPro  6.5  /  Alb  3.8  /  TBili  0.2  /  DBili  x   /  AST  21  /  ALT  17  /  AlkPhos  82  09-01    PT/INR - ( 01 Sep 2024 00:49 )   PT: 9.4 sec;   INR: 0.85 ratio         PTT - ( 01 Sep 2024 00:49 )  PTT:29.3 sec  Daily     Daily     EKG: in chart   CXR: done   Type and Screen: received      A/P: 88y Female with L IT fracture     - OR today with Dr Ricardo for L femur IMN   - NPO past midnight, except medications  - IVF while NPO  - Consent signed and in chart  - Medical clearance for OR is being documented by Dr. Arriaga

## 2024-09-01 NOTE — ED ADULT NURSE NOTE - NSFALLHARMRISKINTERV_ED_ALL_ED

## 2024-09-01 NOTE — CONSULT NOTE ADULT - PROBLEM SELECTOR RECOMMENDATION 9
EKG unchanged from last year, no cardiac symptoms, >4METs, TTE (6/2023) reviewed, Vitals reviewed  - Patient is medically optimized for planned procedure without further cardiac or medical testings

## 2024-09-01 NOTE — CONSULT NOTE ADULT - ASSESSMENT
Assessment/Plan:  88yFemale  with left IT fx     Patient is medically optimized for OR 88 year old female admitted with left IT fx, evaluated for pre-op risks stratification and optimization

## 2024-09-02 LAB
ANION GAP SERPL CALC-SCNC: 10 MMOL/L — SIGNIFICANT CHANGE UP (ref 5–17)
BASOPHILS # BLD AUTO: 0.05 K/UL — SIGNIFICANT CHANGE UP (ref 0–0.2)
BASOPHILS NFR BLD AUTO: 0.7 % — SIGNIFICANT CHANGE UP (ref 0–2)
BUN SERPL-MCNC: 18 MG/DL — SIGNIFICANT CHANGE UP (ref 7–23)
CALCIUM SERPL-MCNC: 8.3 MG/DL — LOW (ref 8.4–10.5)
CHLORIDE SERPL-SCNC: 105 MMOL/L — SIGNIFICANT CHANGE UP (ref 96–108)
CO2 SERPL-SCNC: 24 MMOL/L — SIGNIFICANT CHANGE UP (ref 22–31)
CREAT SERPL-MCNC: 0.87 MG/DL — SIGNIFICANT CHANGE UP (ref 0.5–1.3)
EGFR: 64 ML/MIN/1.73M2 — SIGNIFICANT CHANGE UP
EOSINOPHIL # BLD AUTO: 0.17 K/UL — SIGNIFICANT CHANGE UP (ref 0–0.5)
EOSINOPHIL NFR BLD AUTO: 2.3 % — SIGNIFICANT CHANGE UP (ref 0–6)
GLUCOSE SERPL-MCNC: 104 MG/DL — HIGH (ref 70–99)
HCT VFR BLD CALC: 31.5 % — LOW (ref 34.5–45)
HGB BLD-MCNC: 9.8 G/DL — LOW (ref 11.5–15.5)
IMM GRANULOCYTES NFR BLD AUTO: 0.5 % — SIGNIFICANT CHANGE UP (ref 0–0.9)
LYMPHOCYTES # BLD AUTO: 1.24 K/UL — SIGNIFICANT CHANGE UP (ref 1–3.3)
LYMPHOCYTES # BLD AUTO: 16.9 % — SIGNIFICANT CHANGE UP (ref 13–44)
MCHC RBC-ENTMCNC: 29.9 PG — SIGNIFICANT CHANGE UP (ref 27–34)
MCHC RBC-ENTMCNC: 31.1 GM/DL — LOW (ref 32–36)
MCV RBC AUTO: 96 FL — SIGNIFICANT CHANGE UP (ref 80–100)
MONOCYTES # BLD AUTO: 0.68 K/UL — SIGNIFICANT CHANGE UP (ref 0–0.9)
MONOCYTES NFR BLD AUTO: 9.3 % — SIGNIFICANT CHANGE UP (ref 2–14)
NEUTROPHILS # BLD AUTO: 5.15 K/UL — SIGNIFICANT CHANGE UP (ref 1.8–7.4)
NEUTROPHILS NFR BLD AUTO: 70.3 % — SIGNIFICANT CHANGE UP (ref 43–77)
NRBC # BLD: 0 /100 WBCS — SIGNIFICANT CHANGE UP (ref 0–0)
PLATELET # BLD AUTO: 140 K/UL — LOW (ref 150–400)
POTASSIUM SERPL-MCNC: 3.8 MMOL/L — SIGNIFICANT CHANGE UP (ref 3.5–5.3)
POTASSIUM SERPL-SCNC: 3.8 MMOL/L — SIGNIFICANT CHANGE UP (ref 3.5–5.3)
RBC # BLD: 3.28 M/UL — LOW (ref 3.8–5.2)
RBC # FLD: 14 % — SIGNIFICANT CHANGE UP (ref 10.3–14.5)
SODIUM SERPL-SCNC: 139 MMOL/L — SIGNIFICANT CHANGE UP (ref 135–145)
WBC # BLD: 7.33 K/UL — SIGNIFICANT CHANGE UP (ref 3.8–10.5)
WBC # FLD AUTO: 7.33 K/UL — SIGNIFICANT CHANGE UP (ref 3.8–10.5)

## 2024-09-02 RX ORDER — NETARSUDIL 0.2 MG/ML
1 SOLUTION/ DROPS OPHTHALMIC; TOPICAL
Refills: 0 | DISCHARGE

## 2024-09-02 RX ORDER — BRIMONIDINE TARTRATE 2 MG/ML
1 SOLUTION/ DROPS OPHTHALMIC THREE TIMES A DAY
Refills: 0 | Status: DISCONTINUED | OUTPATIENT
Start: 2024-09-02 | End: 2024-09-04

## 2024-09-02 RX ORDER — BRIMONIDINE TARTRATE 2 MG/ML
1 SOLUTION/ DROPS OPHTHALMIC
Refills: 0 | Status: DISCONTINUED | OUTPATIENT
Start: 2024-09-02 | End: 2024-09-04

## 2024-09-02 RX ORDER — LATANOPROST 50 UG/ML
1 SOLUTION OPHTHALMIC AT BEDTIME
Refills: 0 | Status: DISCONTINUED | OUTPATIENT
Start: 2024-09-02 | End: 2024-09-04

## 2024-09-02 RX ORDER — DORZOLAMIDE HCL/TIMOLOL MALEAT 22.3-6.8/1
1 DROPS OPHTHALMIC (EYE)
Refills: 0 | Status: DISCONTINUED | OUTPATIENT
Start: 2024-09-02 | End: 2024-09-04

## 2024-09-02 RX ADMIN — ACETAMINOPHEN 975 MILLIGRAM(S): 325 TABLET ORAL at 17:16

## 2024-09-02 RX ADMIN — ACETAMINOPHEN 975 MILLIGRAM(S): 325 TABLET ORAL at 09:15

## 2024-09-02 RX ADMIN — Medication 2 TABLET(S): at 22:01

## 2024-09-02 RX ADMIN — Medication 1 DROP(S): at 22:02

## 2024-09-02 RX ADMIN — BRIMONIDINE TARTRATE 1 DROP(S): 2 SOLUTION/ DROPS OPHTHALMIC at 22:02

## 2024-09-02 RX ADMIN — ACETAMINOPHEN 975 MILLIGRAM(S): 325 TABLET ORAL at 18:00

## 2024-09-02 RX ADMIN — BRIMONIDINE TARTRATE 1 DROP(S): 2 SOLUTION/ DROPS OPHTHALMIC at 17:17

## 2024-09-02 RX ADMIN — LATANOPROST 1 DROP(S): 50 SOLUTION OPHTHALMIC at 22:03

## 2024-09-02 RX ADMIN — ACETAMINOPHEN 975 MILLIGRAM(S): 325 TABLET ORAL at 00:13

## 2024-09-02 RX ADMIN — ACETAMINOPHEN 975 MILLIGRAM(S): 325 TABLET ORAL at 08:48

## 2024-09-02 RX ADMIN — ENOXAPARIN SODIUM 40 MILLIGRAM(S): 100 INJECTION SUBCUTANEOUS at 22:01

## 2024-09-02 RX ADMIN — Medication 40 MILLIGRAM(S): at 05:32

## 2024-09-02 NOTE — OCCUPATIONAL THERAPY INITIAL EVALUATION ADULT - ADL RETRAINING, OT EVAL
GOAL: Patient will perform LB dressing with independence by 4 weeks. GOAL: Patient will perform grooming standing at sink with independence by 4 weeks.

## 2024-09-02 NOTE — PHYSICAL THERAPY INITIAL EVALUATION ADULT - PLANNED THERAPY INTERVENTIONS, PT EVAL
GOAL: Pt will ascend/descend (5) steps (I) with U HR and step over step pattern in 4 weeks./balance training/bed mobility training/gait training/transfer training

## 2024-09-02 NOTE — PHYSICAL THERAPY INITIAL EVALUATION ADULT - ADDITIONAL COMMENTS
Pt lives alone in an apartment +several steps to enter, all needs met on main floor. PTA pt was ambulating (I) with R/W and was (I) with all ADLs

## 2024-09-02 NOTE — OCCUPATIONAL THERAPY INITIAL EVALUATION ADULT - NSOTDISCHREC_GEN_A_CORE
If home, Pt will need assist with all ADL and functional mobility, will require poly fly wheelchair, 3:1 commode pt will require commode due to pt confined to single level without safe access to a bathroom, RW/Sub-acute Rehab

## 2024-09-02 NOTE — OCCUPATIONAL THERAPY INITIAL EVALUATION ADULT - DIAGNOSIS, OT EVAL
decreased functional activity endurance, decreased strength, Impaired balance, decreased safety awareness, impacting ability to perform ADL and functional mobility.

## 2024-09-02 NOTE — PHYSICAL THERAPY INITIAL EVALUATION ADULT - WEIGHT-BEARING RESTRICTIONS: GAIT, REHAB EVAL
weight-bearing as tolerated Partial Purse String (Intermediate) Text: Given the location of the defect and the characteristics of the surrounding skin an intermediate purse string closure was deemed most appropriate.  Undermining was performed circumfirentially around the surgical defect.  A purse string suture was then placed and tightened. Wound tension only allowed a partial closure of the circular defect.

## 2024-09-02 NOTE — OCCUPATIONAL THERAPY INITIAL EVALUATION ADULT - PERTINENT HX OF CURRENT PROBLEM, REHAB EVAL
87 y/o Female no PMH c/o L hip pain s/p mechanical fall yesterday, Patient denies head hit or LOC. Patient denies numbness or tingling in the LLE. Patient denies any other injuries. Of note she had a mechanical fall last year and suffered a similar fracture that was treated with R hip IMN by Dr. Ricardo. Found to have L IT fx, now s/p L hip cephalomedullary nail on 9/1. WBAT to LLE as per ortho. Xray L knee: No acute fracture or dislocation. Xray L hip: Acute left hip intratrochanteric fracture. CT Bony pelvis: Acute intertrochanteric left proximal femoral fracture.

## 2024-09-02 NOTE — OCCUPATIONAL THERAPY INITIAL EVALUATION ADULT - ADDITIONAL COMMENTS
Pt lives alone (caretaker for dog) in apartment of [facility], a few steps to enter home and main level set up. As per patient, independent with ADLs prior to admission, ambulated independently with RW, Pt also owns wheelchair. Reports family/niece checks in on Pt

## 2024-09-02 NOTE — PHYSICAL THERAPY INITIAL EVALUATION ADULT - GAIT DEVIATIONS NOTED, PT EVAL
decreased abdulkadir/increased time in double stance/decreased step length/decreased stride length/decreased weight-shifting ability

## 2024-09-02 NOTE — OCCUPATIONAL THERAPY INITIAL EVALUATION ADULT - GENERAL OBSERVATIONS, REHAB EVAL
Upon entry, patient seated at edge of bed, patient agreeable to OT eval, cleared for OT evaluation as per EDU Christianson

## 2024-09-02 NOTE — PHYSICAL THERAPY INITIAL EVALUATION ADULT - PERTINENT HX OF CURRENT PROBLEM, REHAB EVAL
88yFemale no PMH c/o L hip pain s/p mechanical fall yesterday, Patient denies head hit or LOC. Patient denies numbness or tingling in the LLE. Patient denies any other injuries. Of note she had a mechanical fall last year and suffered a similar fracture that was treated with R hip IMN by Dr. Ricardo. Found to have L IT fx, now s/p L hip cephalomedullary nail on 9/1. WBAT to LLE as per ortho. Xray L knee: No acute fracture or dislocation. Xray L hip: Acute left hip intratrochanteric fracture. CT Bony pelvis: Acute intertrochanteric left proximal femoral fracture.

## 2024-09-03 LAB
ANION GAP SERPL CALC-SCNC: 9 MMOL/L — SIGNIFICANT CHANGE UP (ref 5–17)
BUN SERPL-MCNC: 14 MG/DL — SIGNIFICANT CHANGE UP (ref 7–23)
CALCIUM SERPL-MCNC: 8.6 MG/DL — SIGNIFICANT CHANGE UP (ref 8.4–10.5)
CHLORIDE SERPL-SCNC: 106 MMOL/L — SIGNIFICANT CHANGE UP (ref 96–108)
CO2 SERPL-SCNC: 25 MMOL/L — SIGNIFICANT CHANGE UP (ref 22–31)
CREAT SERPL-MCNC: 0.71 MG/DL — SIGNIFICANT CHANGE UP (ref 0.5–1.3)
EGFR: 82 ML/MIN/1.73M2 — SIGNIFICANT CHANGE UP
GLUCOSE SERPL-MCNC: 102 MG/DL — HIGH (ref 70–99)
HCT VFR BLD CALC: 31.4 % — LOW (ref 34.5–45)
HGB BLD-MCNC: 10 G/DL — LOW (ref 11.5–15.5)
MCHC RBC-ENTMCNC: 30.7 PG — SIGNIFICANT CHANGE UP (ref 27–34)
MCHC RBC-ENTMCNC: 31.8 GM/DL — LOW (ref 32–36)
MCV RBC AUTO: 96.3 FL — SIGNIFICANT CHANGE UP (ref 80–100)
NRBC # BLD: 0 /100 WBCS — SIGNIFICANT CHANGE UP (ref 0–0)
PLATELET # BLD AUTO: 148 K/UL — LOW (ref 150–400)
POTASSIUM SERPL-MCNC: 4 MMOL/L — SIGNIFICANT CHANGE UP (ref 3.5–5.3)
POTASSIUM SERPL-SCNC: 4 MMOL/L — SIGNIFICANT CHANGE UP (ref 3.5–5.3)
RBC # BLD: 3.26 M/UL — LOW (ref 3.8–5.2)
RBC # FLD: 13.7 % — SIGNIFICANT CHANGE UP (ref 10.3–14.5)
SODIUM SERPL-SCNC: 140 MMOL/L — SIGNIFICANT CHANGE UP (ref 135–145)
WBC # BLD: 6.86 K/UL — SIGNIFICANT CHANGE UP (ref 3.8–10.5)
WBC # FLD AUTO: 6.86 K/UL — SIGNIFICANT CHANGE UP (ref 3.8–10.5)

## 2024-09-03 RX ADMIN — Medication 40 MILLIGRAM(S): at 05:16

## 2024-09-03 RX ADMIN — ACETAMINOPHEN 975 MILLIGRAM(S): 325 TABLET ORAL at 09:29

## 2024-09-03 RX ADMIN — ACETAMINOPHEN 975 MILLIGRAM(S): 325 TABLET ORAL at 10:10

## 2024-09-03 RX ADMIN — Medication 1 DROP(S): at 09:31

## 2024-09-03 RX ADMIN — LATANOPROST 1 DROP(S): 50 SOLUTION OPHTHALMIC at 21:20

## 2024-09-03 RX ADMIN — BRIMONIDINE TARTRATE 1 DROP(S): 2 SOLUTION/ DROPS OPHTHALMIC at 18:10

## 2024-09-03 RX ADMIN — Medication 2 TABLET(S): at 21:19

## 2024-09-03 RX ADMIN — ACETAMINOPHEN 975 MILLIGRAM(S): 325 TABLET ORAL at 23:01

## 2024-09-03 RX ADMIN — ACETAMINOPHEN 975 MILLIGRAM(S): 325 TABLET ORAL at 18:09

## 2024-09-03 RX ADMIN — BRIMONIDINE TARTRATE 1 DROP(S): 2 SOLUTION/ DROPS OPHTHALMIC at 09:32

## 2024-09-03 RX ADMIN — ENOXAPARIN SODIUM 40 MILLIGRAM(S): 100 INJECTION SUBCUTANEOUS at 21:20

## 2024-09-03 RX ADMIN — Medication 1 DROP(S): at 18:10

## 2024-09-03 RX ADMIN — BRIMONIDINE TARTRATE 1 DROP(S): 2 SOLUTION/ DROPS OPHTHALMIC at 21:19

## 2024-09-03 RX ADMIN — POLYETHYLENE GLYCOL 3350 17 GRAM(S): 17 POWDER, FOR SOLUTION ORAL at 13:15

## 2024-09-03 NOTE — DISCHARGE NOTE PROVIDER - CARE PROVIDER_API CALL
Karlo Ricardo  Orthopaedic Trauma  [address], [address]  [place] [address]  Phone: [phone]  Fax: [phone]  Follow Up Time: 2 weeks

## 2024-09-03 NOTE — DISCHARGE NOTE PROVIDER - NSDCMRMEDTOKEN_GEN_ALL_CORE_FT
acetaminophen 325 mg oral tablet: 3 tab(s) orally every 8 hours as needed for as needed for fever, H/A, mild pain  brimonidine 0.2% ophthalmic solution: 1 in each affected eye 2 times a day 1 drop in right eye twice daily  cholecalciferol oral tablet: 2000 unit(s) orally once a day  dorzolamide-timolol 2.23%-0.68% (2%-0.5% base) ophthalmic solution: 1 in each eye 2 times a day 1 drop each twice daily  enoxaparin: 40 milligram(s) subcutaneous once a day anticoagulation prophylaxis for 1 month post op  latanoprost 0.005% ophthalmic solution: 1 in each eye once a day 1 drop in each eye every evening  pantoprazole 40 mg oral delayed release tablet: 1 tab(s) orally once a day (before a meal)  Rhopressa 0.02% ophthalmic solution: 1 drop(s) in each eye once a day in the LEFT eye only   acetaminophen 325 mg oral tablet: 3 tab(s) orally every 8 hours x 1 week post op, then as needed for mild pain  brimonidine 0.2% ophthalmic solution: 1 in each affected eye 2 times a day 1 drop in right eye twice daily  cholecalciferol oral tablet: 2000 unit(s) orally once a day  dorzolamide-timolol 2.23%-0.68% (2%-0.5% base) ophthalmic solution: 1 in each eye 2 times a day 1 drop each twice daily  enoxaparin: 40 milligram(s) subcutaneously every 24 hours  latanoprost 0.005% ophthalmic solution: 1 in each eye once a day 1 drop in each eye every evening  oxyCODONE 5 mg oral tablet: 0.5 tab(s) orally every 4 hours as needed for  moderate pain ; 1 tab orally every 4 hours as needed for severe pain  pantoprazole 40 mg oral delayed release tablet: 1 tab(s) orally once a day (before a meal)  polyethylene glycol 3350 oral powder for reconstitution: 17 gram(s) orally once a day  Rhopressa 0.02% ophthalmic solution: 1 drop(s) in each eye once a day in the LEFT eye only  senna leaf extract oral tablet: 2 tab(s) orally once a day (at bedtime)

## 2024-09-03 NOTE — DISCHARGE NOTE PROVIDER - HOSPITAL COURSE
History of Present Illness:  88yFemale no PMH c/o L hip pain s/p mechanical fall yesterday, Patient denies head hit or LOC. Patient denies numbness or tingling in the LLE. Patient denies any other injuries. Of note she had a mechanical fall last year and suffered a similar fracture that was treated with R hip IMN by Dr. Ricardo.     Past Medical History:  Glaucoma    Past Surgical History:  R hip IMN (6/2023)    Hospital Course:  Patient was admitted to [facility] on 9/1/24 for surgical management of left IT fx. Following medical clearance and optimization, along with receiving pre-operative parenteral prophylactic antibiotics, the patient underwent a left hip IMN with Dr. Ricardo. Patient tolerated the procedure well and was transferred to the recovery room in stable condition, with a stable neuro/vascular exam of the operated extremity.    Patient was placed on Lovenox for DVT ppx, and was placed on Protonix for GI protection.    Patient was made weight bearing as tolerated left lower extremity.    Patient evaluated by PT/OT and recommended for disposition for [facility].    Internal medicine team was consulted and followed patient through hospital course.    Discharge and orthopedic care instructions were delineated in the discharge plan and reviewed with the patient. All medications were delineated in the medication reconciliation tool and key points were reviewed with the patient. They were deemed stable from an orthopedic & medical standpoint for discharge.

## 2024-09-03 NOTE — DISCHARGE NOTE PROVIDER - NSDCFUADDINST_GEN_ALL_CORE_FT
- Follow up in office with Dr. Ricardo on POD #14. Please call office for appointment.  - Continue weight bearing as tolerated left lower extremity.  - Keep dressing clean, dry, and intact. Do not remove. Have dressing/sutures/surgical staples removed and steri-strips applied in office with Dr. Ricardo on POD #14 if applicable.  - Continue taking Lovenox for 6 weeks for DVT ppx (blood clot prevention).  - Continue medications as per med rec.  - Follow up with your primary care physician regarding your hospitalization and changes to medications within one month of your discharge.

## 2024-09-04 VITALS
OXYGEN SATURATION: 98 % | HEART RATE: 75 BPM | TEMPERATURE: 98 F | RESPIRATION RATE: 18 BRPM | DIASTOLIC BLOOD PRESSURE: 77 MMHG | SYSTOLIC BLOOD PRESSURE: 137 MMHG

## 2024-09-04 LAB
ANION GAP SERPL CALC-SCNC: 11 MMOL/L — SIGNIFICANT CHANGE UP (ref 5–17)
BUN SERPL-MCNC: 21 MG/DL — SIGNIFICANT CHANGE UP (ref 7–23)
CALCIUM SERPL-MCNC: 8.6 MG/DL — SIGNIFICANT CHANGE UP (ref 8.4–10.5)
CHLORIDE SERPL-SCNC: 106 MMOL/L — SIGNIFICANT CHANGE UP (ref 96–108)
CO2 SERPL-SCNC: 23 MMOL/L — SIGNIFICANT CHANGE UP (ref 22–31)
CREAT SERPL-MCNC: 0.78 MG/DL — SIGNIFICANT CHANGE UP (ref 0.5–1.3)
EGFR: 73 ML/MIN/1.73M2 — SIGNIFICANT CHANGE UP
GLUCOSE SERPL-MCNC: 97 MG/DL — SIGNIFICANT CHANGE UP (ref 70–99)
HCT VFR BLD CALC: 28.6 % — LOW (ref 34.5–45)
HGB BLD-MCNC: 9.4 G/DL — LOW (ref 11.5–15.5)
MCHC RBC-ENTMCNC: 31.2 PG — SIGNIFICANT CHANGE UP (ref 27–34)
MCHC RBC-ENTMCNC: 32.9 GM/DL — SIGNIFICANT CHANGE UP (ref 32–36)
MCV RBC AUTO: 95 FL — SIGNIFICANT CHANGE UP (ref 80–100)
NRBC # BLD: 0 /100 WBCS — SIGNIFICANT CHANGE UP (ref 0–0)
PLATELET # BLD AUTO: 150 K/UL — SIGNIFICANT CHANGE UP (ref 150–400)
POTASSIUM SERPL-MCNC: 3.6 MMOL/L — SIGNIFICANT CHANGE UP (ref 3.5–5.3)
POTASSIUM SERPL-SCNC: 3.6 MMOL/L — SIGNIFICANT CHANGE UP (ref 3.5–5.3)
RBC # BLD: 3.01 M/UL — LOW (ref 3.8–5.2)
RBC # FLD: 13.8 % — SIGNIFICANT CHANGE UP (ref 10.3–14.5)
SODIUM SERPL-SCNC: 140 MMOL/L — SIGNIFICANT CHANGE UP (ref 135–145)
WBC # BLD: 6.16 K/UL — SIGNIFICANT CHANGE UP (ref 3.8–10.5)
WBC # FLD AUTO: 6.16 K/UL — SIGNIFICANT CHANGE UP (ref 3.8–10.5)

## 2024-09-04 PROCEDURE — 85025 COMPLETE CBC W/AUTO DIFF WBC: CPT

## 2024-09-04 PROCEDURE — 85027 COMPLETE CBC AUTOMATED: CPT

## 2024-09-04 PROCEDURE — 86850 RBC ANTIBODY SCREEN: CPT

## 2024-09-04 PROCEDURE — 80053 COMPREHEN METABOLIC PANEL: CPT

## 2024-09-04 PROCEDURE — 85730 THROMBOPLASTIN TIME PARTIAL: CPT

## 2024-09-04 PROCEDURE — C1713: CPT

## 2024-09-04 PROCEDURE — 70450 CT HEAD/BRAIN W/O DYE: CPT | Mod: MC

## 2024-09-04 PROCEDURE — 73502 X-RAY EXAM HIP UNI 2-3 VIEWS: CPT

## 2024-09-04 PROCEDURE — C9399: CPT

## 2024-09-04 PROCEDURE — 99285 EMERGENCY DEPT VISIT HI MDM: CPT | Mod: 25

## 2024-09-04 PROCEDURE — C1769: CPT

## 2024-09-04 PROCEDURE — 97166 OT EVAL MOD COMPLEX 45 MIN: CPT

## 2024-09-04 PROCEDURE — 86900 BLOOD TYPING SEROLOGIC ABO: CPT

## 2024-09-04 PROCEDURE — 71045 X-RAY EXAM CHEST 1 VIEW: CPT

## 2024-09-04 PROCEDURE — 97110 THERAPEUTIC EXERCISES: CPT

## 2024-09-04 PROCEDURE — 97161 PT EVAL LOW COMPLEX 20 MIN: CPT

## 2024-09-04 PROCEDURE — 86901 BLOOD TYPING SEROLOGIC RH(D): CPT

## 2024-09-04 PROCEDURE — 97116 GAIT TRAINING THERAPY: CPT

## 2024-09-04 PROCEDURE — 80048 BASIC METABOLIC PNL TOTAL CA: CPT

## 2024-09-04 PROCEDURE — 73560 X-RAY EXAM OF KNEE 1 OR 2: CPT

## 2024-09-04 PROCEDURE — 76377 3D RENDER W/INTRP POSTPROCES: CPT

## 2024-09-04 PROCEDURE — 96374 THER/PROPH/DIAG INJ IV PUSH: CPT

## 2024-09-04 PROCEDURE — 85610 PROTHROMBIN TIME: CPT

## 2024-09-04 PROCEDURE — 72192 CT PELVIS W/O DYE: CPT | Mod: MC

## 2024-09-04 PROCEDURE — 76000 FLUOROSCOPY <1 HR PHYS/QHP: CPT

## 2024-09-04 PROCEDURE — 73552 X-RAY EXAM OF FEMUR 2/>: CPT

## 2024-09-04 RX ORDER — POLYETHYLENE GLYCOL 3350 17 G/17G
17 POWDER, FOR SOLUTION ORAL
Qty: 0 | Refills: 0 | DISCHARGE
Start: 2024-09-04

## 2024-09-04 RX ORDER — SENNA 187 MG
2 TABLET ORAL
Qty: 0 | Refills: 0 | DISCHARGE
Start: 2024-09-04

## 2024-09-04 RX ORDER — ENOXAPARIN SODIUM 100 MG/ML
40 INJECTION SUBCUTANEOUS
Qty: 0 | Refills: 0 | DISCHARGE
Start: 2024-09-04

## 2024-09-04 RX ORDER — ACETAMINOPHEN 325 MG/1
3 TABLET ORAL
Qty: 0 | Refills: 0 | DISCHARGE
Start: 2024-09-04

## 2024-09-04 RX ORDER — OXYCODONE HYDROCHLORIDE 5 MG/1
0.5 TABLET ORAL
Qty: 0 | Refills: 0 | DISCHARGE
Start: 2024-09-04

## 2024-09-04 RX ORDER — PANTOPRAZOLE SODIUM 40 MG
1 TABLET, DELAYED RELEASE (ENTERIC COATED) ORAL
Qty: 0 | Refills: 0 | DISCHARGE
Start: 2024-09-04

## 2024-09-04 RX ADMIN — BRIMONIDINE TARTRATE 1 DROP(S): 2 SOLUTION/ DROPS OPHTHALMIC at 08:55

## 2024-09-04 RX ADMIN — Medication 1 DROP(S): at 08:55

## 2024-09-04 RX ADMIN — ACETAMINOPHEN 975 MILLIGRAM(S): 325 TABLET ORAL at 09:55

## 2024-09-04 RX ADMIN — Medication 40 MILLIGRAM(S): at 05:47

## 2024-09-04 RX ADMIN — ACETAMINOPHEN 975 MILLIGRAM(S): 325 TABLET ORAL at 00:01

## 2024-09-04 RX ADMIN — BRIMONIDINE TARTRATE 1 DROP(S): 2 SOLUTION/ DROPS OPHTHALMIC at 09:04

## 2024-09-04 RX ADMIN — BRIMONIDINE TARTRATE 1 DROP(S): 2 SOLUTION/ DROPS OPHTHALMIC at 04:45

## 2024-09-04 RX ADMIN — ACETAMINOPHEN 975 MILLIGRAM(S): 325 TABLET ORAL at 08:55

## 2024-09-04 NOTE — DISCHARGE NOTE NURSING/CASE MANAGEMENT/SOCIAL WORK - NSDCPEFALRISK_GEN_ALL_CORE
For information on Fall & Injury Prevention, visit: [email or web address] OR  [email or web address] OR  https://www.cdc.gov/steadi/patient.html

## 2024-09-04 NOTE — PROGRESS NOTE ADULT - ASSESSMENT
A/P: 88yFemale s/p L Hip IMN, POD1. VSS. NAD.  PT/OT- WBAT LLE   IS  DVT PPx: Lovenox 40 mg QD, SCD   GI PPx: Protonix 40 mg QD   Pain Control  Continue Current Tx.  Dispo planning: recommended for [facility], pending further PT/OT eval.     George Rosen PA-C  Orthopedic Surgery   Team Pager: #[phone]
A/P: 88yFemale s/p L Hip IMN, POD3    PT/OT- WBAT LLE   IS  DVT PPx: Lovenox 40 mg QD, SCD   GI PPx: Protonix 40 mg QD   Dressing changed 9/3  Pain Control  Continue Current Tx.  Dispo planning: [facility] per PT  
A/P: 88yFemale s/p L Hip IMN, POD2     PT/OT- WBAT LLE   IS  DVT PPx: Lovenox 40 mg QD, SCD   GI PPx: Protonix 40 mg QD   Dressing changed   Pain Control  Continue Current Tx.  Dispo planning: [facility] per PT

## 2024-09-04 NOTE — PROGRESS NOTE ADULT - SUBJECTIVE AND OBJECTIVE BOX
Post op Day [1]    Patient resting without complaints. No acute overnight events.  No chest pain, SOB, N/V/D/HA.     T(C): 36.3 (09-02-24 @ 08:29), Max: 37 (09-02-24 @ 00:11)  HR: 107 (09-02-24 @ 08:29) (74 - 107)  BP: 163/73 (09-02-24 @ 08:29) (107/68 - 163/73)  RR: 17 (09-02-24 @ 08:29) (14 - 17)  SpO2: 97% (09-02-24 @ 08:29) (93% - 98%)  Wt(kg): --    Exam:  Alert and Oriented, No Acute Distress  Cardiac: Normal S1 & S2, RRR, No murmurs, rubs or gallops appreciated.  Pulmonary: 18bpm, normal breathing effort, no retractions, diminished lung sounds appreciated.  Bronchial/Vesicular lungs sounds appreciated throughout all lung lobes.  Lower Extremities: Left Lower Extremity  Dressing: Guaze and tegaderm (x3) C/D/I  Calves Soft, Non-tender bilaterally  Motor: 5/5 (+) PF/DF/EHL/FHL  Sensory: SILT  2+ DP/PT Pulse  Extremities warm and well-perfused, cap refill < 2 sec.     Xray:   Labs:                        9.8    7.33  )-----------( 140      ( 02 Sep 2024 07:16 )             31.5    09-02    139  |  105  |  18  ----------------------------<  104<H>  3.8   |  24  |  0.87    Ca    8.3<L>      02 Sep 2024 07:18    TPro  6.5  /  Alb  3.8  /  TBili  0.2  /  DBili  x   /  AST  21  /  ALT  17  /  AlkPhos  82  09-01    
Patient seen and examined at bedside. Pain controlled with medication. Patient denies any numbness, tingling, weakness, or any other orthopaedic complaint. Patient aware of plan to discharge to rehab.    Vital Signs Last 24 Hrs  T(C): 36.9 (04 Sep 2024 04:46), Max: 37.1 (03 Sep 2024 08:40)  T(F): 98.4 (04 Sep 2024 04:46), Max: 98.8 (03 Sep 2024 08:40)  HR: 72 (04 Sep 2024 04:46) (71 - 85)  BP: 137/75 (04 Sep 2024 04:46) (100/62 - 137/75)  BP(mean): --  RR: 18 (04 Sep 2024 04:46) (18 - 18)  SpO2: 98% (04 Sep 2024 04:46) (96% - 99%)    Parameters below as of 04 Sep 2024 04:46  Patient On (Oxygen Delivery Method): room air      Exam:  Alert and Oriented, No Acute Distress    Lower Extremities: Left Lower Extremity  Dressing: Dressings changed 9/3; clean dry and intact   Calves Soft, Non-tender bilaterally  Motor: (+) PF/DF/EHL/FHL  Sensory: SILT  + DP Pulse  Extremities warm and well-perfused, cap refill < 2 sec.   
Patient seen and examined at bedside. Pain well controlled with medication. Patient denies any numbness, tingling, weakness, or any other orthopaedic complaint. Looking forward to working with PT.     Vital Signs Last 24 Hrs  T(C): 37 (03 Sep 2024 04:28), Max: 37.1 (02 Sep 2024 12:17)  T(F): 98.6 (03 Sep 2024 04:28), Max: 98.7 (02 Sep 2024 12:17)  HR: 73 (03 Sep 2024 04:28) (70 - 107)  BP: 150/80 (03 Sep 2024 04:28) (124/76 - 169/84)  BP(mean): --  RR: 18 (03 Sep 2024 04:28) (17 - 18)  SpO2: 97% (03 Sep 2024 04:28) (92% - 97%)    Parameters below as of 03 Sep 2024 04:28  Patient On (Oxygen Delivery Method): room air    Exam:  Alert and Oriented, No Acute Distress  Cardiac: Normal S1 & S2, RRR, No murmurs, rubs or gallops appreciated.  Pulmonary: 18bpm, normal breathing effort, no retractions, diminished lung sounds appreciated.  Bronchial/Vesicular lungs sounds appreciated throughout all lung lobes.  Lower Extremities: Left Lower Extremity  Dressing: Dressings changed; incisions well healing.   Calves Soft, Non-tender bilaterally  Motor: 5/5 (+) PF/DF/EHL/FHL  Sensory: SILT  2+ DP/PT Pulse  Extremities warm and well-perfused, cap refill < 2 sec.     AM labs pending

## 2024-09-30 PROBLEM — Z00.00 ENCOUNTER FOR PREVENTIVE HEALTH EXAMINATION: Status: ACTIVE | Noted: 2024-09-30

## 2024-10-02 ENCOUNTER — APPOINTMENT (OUTPATIENT)
Dept: ORTHOPEDIC SURGERY | Facility: CLINIC | Age: 89
End: 2024-10-02
Payer: MEDICARE

## 2024-10-02 DIAGNOSIS — S72.142A DISPLACED INTERTROCHANTERIC FRACTURE OF LEFT FEMUR, INITIAL ENCOUNTER FOR CLOSED FRACTURE: ICD-10-CM

## 2024-10-02 DIAGNOSIS — S72.141A DISPLACED INTERTROCHANTERIC FRACTURE OF RIGHT FEMUR, INITIAL ENCOUNTER FOR CLOSED FRACTURE: ICD-10-CM

## 2024-10-02 PROCEDURE — 99024 POSTOP FOLLOW-UP VISIT: CPT

## 2024-10-02 PROCEDURE — 73502 X-RAY EXAM HIP UNI 2-3 VIEWS: CPT

## 2024-10-02 NOTE — HISTORY OF PRESENT ILLNESS
[de-identified] : s/p left femur IMN, DOS: 9/1/24 [de-identified] : Ms. JULIANO SWIFT is a 88 year old woman presents today for post-op appointment s/p left short femur IMN on 9/1/24. Since her surgery she states she is feeling better. She has been ambulating with the walker since the injury to her right hip in June of 2023. She has been participating in home exercises daily which have been helpful.   Of note, she is s/p right femur IMN on 6/25/23 [de-identified] : The patient is sitting comfortably in the exam room.  LEFT hip -Skin is intact, no swelling, no ecchymosis -incisions clean and dry, no erythema, no signs of infection -No tenderness to palpation over the greater trochanter -Painless range of motion hip -Sensation is intact L1-S1 -5/5 EHL, FHL, TA, GS, quadriceps, hamstrings -Foot is warm and well-perfused, palpable dorsalis pedis pulse   The patient is sitting comfortably in the exam room.  RIGHT hip -Skin is intact, no swelling, no ecchymosis -incisions well-healed, no erythema, no signs of infection -No tenderness to palpation over the greater trochanter -Painless range of motion hip -Sensation is intact L1-S1 -5/5 EHL, FHL, TA, GS, quadriceps, hamstrings -Foot is warm and well-perfused, palpable dorsalis pedis pulse  [de-identified] :  X-rays of bilateral hips and AP pelvis were taken in the office today, 10/2/24. X-rays include AP and lateral of the hip.  X-rays show good overall alignment of the pelvis.  The patient is status post bilateral short cephalomedullary nails.  The right hip has significant heterotopic ossification around the subtrochanteric region with healing of the intertrochanteric hip fracture.  The left hip is well aligned with no significant displacement from previous images. [de-identified] : 88-year-old woman s/p left femur IMN, approximately 4.5 weeks out and s/p right femur IMN, approximately 1 year and 3.5 months out.  [de-identified] : -X-ray and physical exam findings were discussed with the patient -Weightbearing as tolerated bilateral LE -Physical therapy: gait training - PT is medically necessary to optimize the patients recovery.  -Follow up in 4 months with x-rays of the left hip and AP pelvis at that time. -All the patient's questions and concerns were addressed during this visit

## 2024-10-02 NOTE — HISTORY OF PRESENT ILLNESS
[de-identified] : s/p left femur IMN, DOS: 9/1/24 [de-identified] : Ms. JULIANO SWIFT is a 88 year old woman presents today for post-op appointment s/p left short femur IMN on 9/1/24. Since her surgery she states she is feeling better. She has been ambulating with the walker since the injury to her right hip in June of 2023. She has been participating in home exercises daily which have been helpful.   Of note, she is s/p right femur IMN on 6/25/23 [de-identified] : The patient is sitting comfortably in the exam room.  LEFT hip -Skin is intact, no swelling, no ecchymosis -incisions clean and dry, no erythema, no signs of infection -No tenderness to palpation over the greater trochanter -Painless range of motion hip -Sensation is intact L1-S1 -5/5 EHL, FHL, TA, GS, quadriceps, hamstrings -Foot is warm and well-perfused, palpable dorsalis pedis pulse   The patient is sitting comfortably in the exam room.  RIGHT hip -Skin is intact, no swelling, no ecchymosis -incisions well-healed, no erythema, no signs of infection -No tenderness to palpation over the greater trochanter -Painless range of motion hip -Sensation is intact L1-S1 -5/5 EHL, FHL, TA, GS, quadriceps, hamstrings -Foot is warm and well-perfused, palpable dorsalis pedis pulse  [de-identified] :  X-rays of bilateral hips and AP pelvis were taken in the office today, 10/2/24. X-rays include AP and lateral of the hip.  X-rays show good overall alignment of the pelvis.  The patient is status post bilateral short cephalomedullary nails.  The right hip has significant heterotopic ossification around the subtrochanteric region with healing of the intertrochanteric hip fracture.  The left hip is well aligned with no significant displacement from previous images. [de-identified] : 88-year-old woman s/p left femur IMN, approximately 4.5 weeks out and s/p right femur IMN, approximately 1 year and 3.5 months out.  [de-identified] : -X-ray and physical exam findings were discussed with the patient -Weightbearing as tolerated bilateral LE -Physical therapy: gait training - PT is medically necessary to optimize the patients recovery.  -Follow up in 4 months with x-rays of the left hip and AP pelvis at that time. -All the patient's questions and concerns were addressed during this visit

## 2024-10-02 NOTE — HISTORY OF PRESENT ILLNESS
[de-identified] : s/p left femur IMN, DOS: 9/1/24 [de-identified] : Ms. JULIANO SWIFT is a 88 year old woman presents today for post-op appointment s/p left short femur IMN on 9/1/24. Since her surgery she states she is feeling better. She has been ambulating with the walker since the injury to her right hip in June of 2023. She has been participating in home exercises daily which have been helpful.   Of note, she is s/p right femur IMN on 6/25/23 [de-identified] : The patient is sitting comfortably in the exam room.  LEFT hip -Skin is intact, no swelling, no ecchymosis -incisions clean and dry, no erythema, no signs of infection -No tenderness to palpation over the greater trochanter -Painless range of motion hip -Sensation is intact L1-S1 -5/5 EHL, FHL, TA, GS, quadriceps, hamstrings -Foot is warm and well-perfused, palpable dorsalis pedis pulse   The patient is sitting comfortably in the exam room.  RIGHT hip -Skin is intact, no swelling, no ecchymosis -incisions well-healed, no erythema, no signs of infection -No tenderness to palpation over the greater trochanter -Painless range of motion hip -Sensation is intact L1-S1 -5/5 EHL, FHL, TA, GS, quadriceps, hamstrings -Foot is warm and well-perfused, palpable dorsalis pedis pulse  [de-identified] :  X-rays of bilateral hips and AP pelvis were taken in the office today, 10/2/24. X-rays include AP and lateral of the hip.  X-rays show good overall alignment of the pelvis.  The patient is status post bilateral short cephalomedullary nails.  The right hip has significant heterotopic ossification around the subtrochanteric region with healing of the intertrochanteric hip fracture.  The left hip is well aligned with no significant displacement from previous images. [de-identified] : 88-year-old woman s/p left femur IMN, approximately 4.5 weeks out and s/p right femur IMN, approximately 1 year and 3.5 months out.  [de-identified] : -X-ray and physical exam findings were discussed with the patient -Weightbearing as tolerated bilateral LE -Physical therapy: gait training - PT is medically necessary to optimize the patients recovery.  -Follow up in 4 months with x-rays of the left hip and AP pelvis at that time. -All the patient's questions and concerns were addressed during this visit

## 2025-02-05 ENCOUNTER — APPOINTMENT (OUTPATIENT)
Dept: ORTHOPEDIC SURGERY | Facility: CLINIC | Age: 89
End: 2025-02-05

## (undated) DEVICE — VENODYNE/SCD SLEEVE CALF LARGE

## (undated) DEVICE — SOL IRR POUR H2O 250ML

## (undated) DEVICE — DRILL BIT STRYKER ORTHO FREEHAND 4.2X185MM

## (undated) DEVICE — GLV 8 PROTEXIS (WHITE)

## (undated) DEVICE — GLV 7.5 PROTEXIS (WHITE)

## (undated) DEVICE — SUT POLYSORB 2-0 30" GS-21 UNDYED

## (undated) DEVICE — REAMER STRYKER ORTHO SHAFT MOD TRINKLE

## (undated) DEVICE — SYR LUER LOK 20CC

## (undated) DEVICE — DRAPE C ARM UNIVERSAL

## (undated) DEVICE — SUT MONOCRYL 3-0 18" PS-2 UNDYED

## (undated) DEVICE — DRAPE U 47X51" NON STERILE

## (undated) DEVICE — SOL IRR POUR NS 0.9% 500ML

## (undated) DEVICE — PACK HIP PINNING

## (undated) DEVICE — GLV 8 PROTEXIS (BLUE)

## (undated) DEVICE — DRAPE 3/4 SHEET W REINFORCEMENT 56X77"

## (undated) DEVICE — DRSG DERMABOND 0.7ML

## (undated) DEVICE — DRILL BIT STRYKER ORTHO 4.2 X 340MM

## (undated) DEVICE — TAPE SILK 3"

## (undated) DEVICE — CLIP CLSD TB GAMMA III

## (undated) DEVICE — DRAPE TOWEL BLUE 17" X 24"

## (undated) DEVICE — DRAPE C ARM C-ARMOUR

## (undated) DEVICE — PACK EXTREMITY

## (undated) DEVICE — DRSG CURITY GAUZE SPONGE 4 X 4" 12-PLY

## (undated) DEVICE — POSITIONER FOAM HEADREST (PINK)

## (undated) DEVICE — NDL HYPO SAFE 22G X 1.5" (BLACK)

## (undated) DEVICE — DRAPE SPLIT SHEET 77" X 108"

## (undated) DEVICE — DRAPE MAYO STAND 30"

## (undated) DEVICE — SUT POLYSORB 0 30" GS-21 UNDYED

## (undated) DEVICE — POSITIONER FOAM EGG CRATE ULNAR 2PCS (PINK)

## (undated) DEVICE — DRILL BIT STRYKER ORTHO LOKG 4.2X360MM

## (undated) DEVICE — WARMING BLANKET UPPER ADULT

## (undated) DEVICE — GLV 8.5 PROTEXIS (WHITE)

## (undated) DEVICE — DRAPE IOBAN 13" X 13"

## (undated) DEVICE — DRSG STERISTRIPS 0.5 X 4"

## (undated) DEVICE — STAPLER SKIN VISI-STAT 35 WIDE

## (undated) DEVICE — VENODYNE/SCD SLEEVE CALF MEDIUM